# Patient Record
Sex: MALE | Race: WHITE | HISPANIC OR LATINO | Employment: OTHER | URBAN - METROPOLITAN AREA
[De-identification: names, ages, dates, MRNs, and addresses within clinical notes are randomized per-mention and may not be internally consistent; named-entity substitution may affect disease eponyms.]

---

## 2023-02-21 LAB
C REACTIVE PROTEIN (MG/L) IN SER/PLAS: 0.18 MG/DL
SEDIMENTATION RATE, ERYTHROCYTE: 23 MM/H (ref 0–20)

## 2023-02-23 LAB
GRAM STAIN: ABNORMAL
TISSUE/WOUND CULTURE/SMEAR: ABNORMAL

## 2023-03-07 LAB
C REACTIVE PROTEIN (MG/L) IN SER/PLAS: 0.61 MG/DL
SEDIMENTATION RATE, ERYTHROCYTE: 33 MM/H (ref 0–20)

## 2023-04-10 LAB
ALBUMIN (G/DL) IN SER/PLAS: 4.3 G/DL (ref 3.4–5)
ALBUMIN (MG/L) IN URINE: 123.9 MG/L
ALBUMIN/CREATININE (UG/MG) IN URINE: 86 UG/MG CRT (ref 0–30)
ANION GAP IN SER/PLAS: 10 MMOL/L (ref 10–20)
BASOPHILS (10*3/UL) IN BLOOD BY AUTOMATED COUNT: 0.05 X10E9/L (ref 0–0.1)
BASOPHILS/100 LEUKOCYTES IN BLOOD BY AUTOMATED COUNT: 0.5 % (ref 0–2)
CALCIUM (MG/DL) IN SER/PLAS: 9.5 MG/DL (ref 8.6–10.3)
CARBON DIOXIDE, TOTAL (MMOL/L) IN SER/PLAS: 31 MMOL/L (ref 21–32)
CHLORIDE (MMOL/L) IN SER/PLAS: 104 MMOL/L (ref 98–107)
CHOLESTEROL (MG/DL) IN SER/PLAS: 141 MG/DL (ref 0–199)
CHOLESTEROL IN HDL (MG/DL) IN SER/PLAS: 60.9 MG/DL
CHOLESTEROL/HDL RATIO: 2.3
CREATININE (MG/DL) IN SER/PLAS: 0.91 MG/DL (ref 0.5–1.3)
CREATININE (MG/DL) IN URINE: 144 MG/DL (ref 20–370)
EOSINOPHILS (10*3/UL) IN BLOOD BY AUTOMATED COUNT: 0.13 X10E9/L (ref 0–0.7)
EOSINOPHILS/100 LEUKOCYTES IN BLOOD BY AUTOMATED COUNT: 1.2 % (ref 0–6)
ERYTHROCYTE DISTRIBUTION WIDTH (RATIO) BY AUTOMATED COUNT: 14.5 % (ref 11.5–14.5)
ERYTHROCYTE MEAN CORPUSCULAR HEMOGLOBIN CONCENTRATION (G/DL) BY AUTOMATED: 31.5 G/DL (ref 32–36)
ERYTHROCYTE MEAN CORPUSCULAR VOLUME (FL) BY AUTOMATED COUNT: 93 FL (ref 80–100)
ERYTHROCYTES (10*6/UL) IN BLOOD BY AUTOMATED COUNT: 5.19 X10E12/L (ref 4.5–5.9)
GFR MALE: >90 ML/MIN/1.73M2
GLUCOSE (MG/DL) IN SER/PLAS: 93 MG/DL (ref 74–99)
HEMATOCRIT (%) IN BLOOD BY AUTOMATED COUNT: 48.5 % (ref 41–52)
HEMOGLOBIN (G/DL) IN BLOOD: 15.3 G/DL (ref 13.5–17.5)
IMMATURE GRANULOCYTES/100 LEUKOCYTES IN BLOOD BY AUTOMATED COUNT: 0.5 % (ref 0–0.9)
INR IN PPP BY COAGULATION ASSAY: 1 (ref 0.9–1.1)
LDL: 58 MG/DL (ref 0–99)
LEUKOCYTES (10*3/UL) IN BLOOD BY AUTOMATED COUNT: 11 X10E9/L (ref 4.4–11.3)
LYMPHOCYTES (10*3/UL) IN BLOOD BY AUTOMATED COUNT: 1.59 X10E9/L (ref 1.2–4.8)
LYMPHOCYTES/100 LEUKOCYTES IN BLOOD BY AUTOMATED COUNT: 14.5 % (ref 13–44)
MONOCYTES (10*3/UL) IN BLOOD BY AUTOMATED COUNT: 0.66 X10E9/L (ref 0.1–1)
MONOCYTES/100 LEUKOCYTES IN BLOOD BY AUTOMATED COUNT: 6 % (ref 2–10)
NEUTROPHILS (10*3/UL) IN BLOOD BY AUTOMATED COUNT: 8.51 X10E9/L (ref 1.2–7.7)
NEUTROPHILS/100 LEUKOCYTES IN BLOOD BY AUTOMATED COUNT: 77.3 % (ref 40–80)
NRBC (PER 100 WBCS) BY AUTOMATED COUNT: 0 /100 WBC (ref 0–0)
PHOSPHATE (MG/DL) IN SER/PLAS: 2.5 MG/DL (ref 2.5–4.9)
PLATELETS (10*3/UL) IN BLOOD AUTOMATED COUNT: 300 X10E9/L (ref 150–450)
POTASSIUM (MMOL/L) IN SER/PLAS: 4.4 MMOL/L (ref 3.5–5.3)
PROSTATE SPECIFIC AG (NG/ML) IN SER/PLAS: 1.88 NG/ML (ref 0–4)
PROTHROMBIN TIME (PT) IN PPP BY COAGULATION ASSAY: 11.1 SEC (ref 9.8–13.4)
SODIUM (MMOL/L) IN SER/PLAS: 141 MMOL/L (ref 136–145)
TRIGLYCERIDE (MG/DL) IN SER/PLAS: 110 MG/DL (ref 0–149)
UREA NITROGEN (MG/DL) IN SER/PLAS: 15 MG/DL (ref 6–23)
VLDL: 22 MG/DL (ref 0–40)

## 2023-04-24 ENCOUNTER — HOSPITAL ENCOUNTER (OUTPATIENT)
Dept: DATA CONVERSION | Facility: HOSPITAL | Age: 68
End: 2023-04-24
Attending: INTERNAL MEDICINE | Admitting: INTERNAL MEDICINE
Payer: MEDICARE

## 2023-04-24 DIAGNOSIS — I73.89 OTHER SPECIFIED PERIPHERAL VASCULAR DISEASES (CMS-HCC): ICD-10-CM

## 2023-04-24 DIAGNOSIS — Z98.1 ARTHRODESIS STATUS: ICD-10-CM

## 2023-04-24 DIAGNOSIS — Z96.652 PRESENCE OF LEFT ARTIFICIAL KNEE JOINT: ICD-10-CM

## 2023-04-24 DIAGNOSIS — I10 ESSENTIAL (PRIMARY) HYPERTENSION: ICD-10-CM

## 2023-04-24 DIAGNOSIS — E78.5 HYPERLIPIDEMIA, UNSPECIFIED: ICD-10-CM

## 2023-04-24 DIAGNOSIS — I99.8 OTHER DISORDER OF CIRCULATORY SYSTEM: ICD-10-CM

## 2023-04-24 DIAGNOSIS — Z53.9 PROCEDURE AND TREATMENT NOT CARRIED OUT, UNSPECIFIED REASON: ICD-10-CM

## 2023-04-24 LAB
INR IN PPP BY COAGULATION ASSAY: 1 (ref 0.9–1.1)
PROTHROMBIN TIME (PT) IN PPP BY COAGULATION ASSAY: 11.3 SEC (ref 9.8–13.4)

## 2023-05-09 LAB
C REACTIVE PROTEIN (MG/L) IN SER/PLAS: 8.74 MG/DL
SEDIMENTATION RATE, ERYTHROCYTE: 47 MM/H (ref 0–20)

## 2023-05-12 LAB
GRAM STAIN: ABNORMAL
TISSUE/WOUND CULTURE/SMEAR: ABNORMAL

## 2023-05-19 LAB
GRAM STAIN: ABNORMAL
TISSUE/WOUND CULTURE/SMEAR: ABNORMAL

## 2023-05-31 LAB
ANION GAP IN SER/PLAS: 11 MMOL/L (ref 10–20)
C REACTIVE PROTEIN (MG/L) IN SER/PLAS: 1.76 MG/DL
CALCIUM (MG/DL) IN SER/PLAS: 8.7 MG/DL (ref 8.6–10.3)
CARBON DIOXIDE, TOTAL (MMOL/L) IN SER/PLAS: 29 MMOL/L (ref 21–32)
CHLORIDE (MMOL/L) IN SER/PLAS: 104 MMOL/L (ref 98–107)
CREATININE (MG/DL) IN SER/PLAS: 0.8 MG/DL (ref 0.5–1.3)
ERYTHROCYTE DISTRIBUTION WIDTH (RATIO) BY AUTOMATED COUNT: 13.8 % (ref 11.5–14.5)
ERYTHROCYTE MEAN CORPUSCULAR HEMOGLOBIN CONCENTRATION (G/DL) BY AUTOMATED: 31.6 G/DL (ref 32–36)
ERYTHROCYTE MEAN CORPUSCULAR VOLUME (FL) BY AUTOMATED COUNT: 92 FL (ref 80–100)
ERYTHROCYTES (10*6/UL) IN BLOOD BY AUTOMATED COUNT: 4.26 X10E12/L (ref 4.5–5.9)
GFR MALE: >90 ML/MIN/1.73M2
GLUCOSE (MG/DL) IN SER/PLAS: 136 MG/DL (ref 74–99)
HEMATOCRIT (%) IN BLOOD BY AUTOMATED COUNT: 39.3 % (ref 41–52)
HEMOGLOBIN (G/DL) IN BLOOD: 12.4 G/DL (ref 13.5–17.5)
LEUKOCYTES (10*3/UL) IN BLOOD BY AUTOMATED COUNT: 10.1 X10E9/L (ref 4.4–11.3)
NRBC (PER 100 WBCS) BY AUTOMATED COUNT: 0 /100 WBC (ref 0–0)
PLATELETS (10*3/UL) IN BLOOD AUTOMATED COUNT: 313 X10E9/L (ref 150–450)
POTASSIUM (MMOL/L) IN SER/PLAS: 4.1 MMOL/L (ref 3.5–5.3)
SEDIMENTATION RATE, ERYTHROCYTE: 48 MM/H (ref 0–20)
SODIUM (MMOL/L) IN SER/PLAS: 140 MMOL/L (ref 136–145)
UREA NITROGEN (MG/DL) IN SER/PLAS: 13 MG/DL (ref 6–23)
VANCOMYCIN (UG/ML) IN SER/PLAS - TROUGH: 15 UG/ML (ref 5–20)

## 2023-06-06 LAB
ANION GAP IN SER/PLAS: 11 MMOL/L (ref 10–20)
C REACTIVE PROTEIN (MG/L) IN SER/PLAS: 0.43 MG/DL
CALCIUM (MG/DL) IN SER/PLAS: 8.5 MG/DL (ref 8.6–10.3)
CARBON DIOXIDE, TOTAL (MMOL/L) IN SER/PLAS: 28 MMOL/L (ref 21–32)
CHLORIDE (MMOL/L) IN SER/PLAS: 105 MMOL/L (ref 98–107)
CREATININE (MG/DL) IN SER/PLAS: 0.75 MG/DL (ref 0.5–1.3)
ERYTHROCYTE DISTRIBUTION WIDTH (RATIO) BY AUTOMATED COUNT: 13.5 % (ref 11.5–14.5)
ERYTHROCYTE MEAN CORPUSCULAR HEMOGLOBIN CONCENTRATION (G/DL) BY AUTOMATED: 30.4 G/DL (ref 32–36)
ERYTHROCYTE MEAN CORPUSCULAR VOLUME (FL) BY AUTOMATED COUNT: 93 FL (ref 80–100)
ERYTHROCYTES (10*6/UL) IN BLOOD BY AUTOMATED COUNT: 4.5 X10E12/L (ref 4.5–5.9)
GFR MALE: >90 ML/MIN/1.73M2
GLUCOSE (MG/DL) IN SER/PLAS: 145 MG/DL (ref 74–99)
HEMATOCRIT (%) IN BLOOD BY AUTOMATED COUNT: 41.8 % (ref 41–52)
HEMOGLOBIN (G/DL) IN BLOOD: 12.7 G/DL (ref 13.5–17.5)
LEUKOCYTES (10*3/UL) IN BLOOD BY AUTOMATED COUNT: 10.4 X10E9/L (ref 4.4–11.3)
NRBC (PER 100 WBCS) BY AUTOMATED COUNT: 0 /100 WBC (ref 0–0)
PLATELETS (10*3/UL) IN BLOOD AUTOMATED COUNT: 270 X10E9/L (ref 150–450)
POTASSIUM (MMOL/L) IN SER/PLAS: 3.9 MMOL/L (ref 3.5–5.3)
SEDIMENTATION RATE, ERYTHROCYTE: 29 MM/H (ref 0–20)
SODIUM (MMOL/L) IN SER/PLAS: 140 MMOL/L (ref 136–145)
UREA NITROGEN (MG/DL) IN SER/PLAS: 15 MG/DL (ref 6–23)
VANCOMYCIN (UG/ML) IN SER/PLAS - TROUGH: 14 UG/ML (ref 5–20)

## 2023-06-14 LAB
ANION GAP IN SER/PLAS: 13 MMOL/L (ref 10–20)
C REACTIVE PROTEIN (MG/L) IN SER/PLAS: 1.13 MG/DL
CALCIUM (MG/DL) IN SER/PLAS: 9.4 MG/DL (ref 8.6–10.3)
CARBON DIOXIDE, TOTAL (MMOL/L) IN SER/PLAS: 29 MMOL/L (ref 21–32)
CHLORIDE (MMOL/L) IN SER/PLAS: 102 MMOL/L (ref 98–107)
CREATININE (MG/DL) IN SER/PLAS: 0.86 MG/DL (ref 0.5–1.3)
ERYTHROCYTE DISTRIBUTION WIDTH (RATIO) BY AUTOMATED COUNT: 13.6 % (ref 11.5–14.5)
ERYTHROCYTE MEAN CORPUSCULAR HEMOGLOBIN CONCENTRATION (G/DL) BY AUTOMATED: 32 G/DL (ref 32–36)
ERYTHROCYTE MEAN CORPUSCULAR VOLUME (FL) BY AUTOMATED COUNT: 91 FL (ref 80–100)
ERYTHROCYTES (10*6/UL) IN BLOOD BY AUTOMATED COUNT: 4.82 X10E12/L (ref 4.5–5.9)
GFR MALE: >90 ML/MIN/1.73M2
GLUCOSE (MG/DL) IN SER/PLAS: 130 MG/DL (ref 74–99)
HEMATOCRIT (%) IN BLOOD BY AUTOMATED COUNT: 43.8 % (ref 41–52)
HEMOGLOBIN (G/DL) IN BLOOD: 14 G/DL (ref 13.5–17.5)
LEUKOCYTES (10*3/UL) IN BLOOD BY AUTOMATED COUNT: 12.6 X10E9/L (ref 4.4–11.3)
NRBC (PER 100 WBCS) BY AUTOMATED COUNT: 0 /100 WBC (ref 0–0)
PLATELETS (10*3/UL) IN BLOOD AUTOMATED COUNT: 290 X10E9/L (ref 150–450)
POTASSIUM (MMOL/L) IN SER/PLAS: 3.5 MMOL/L (ref 3.5–5.3)
SEDIMENTATION RATE, ERYTHROCYTE: 36 MM/H (ref 0–20)
SODIUM (MMOL/L) IN SER/PLAS: 140 MMOL/L (ref 136–145)
UREA NITROGEN (MG/DL) IN SER/PLAS: 13 MG/DL (ref 6–23)
VANCOMYCIN (UG/ML) IN SER/PLAS - TROUGH: 14.9 UG/ML (ref 5–20)

## 2023-06-20 LAB
ANION GAP IN SER/PLAS: 12 MMOL/L (ref 10–20)
C REACTIVE PROTEIN (MG/L) IN SER/PLAS: 8.38 MG/DL
CALCIUM (MG/DL) IN SER/PLAS: 8.9 MG/DL (ref 8.6–10.3)
CARBON DIOXIDE, TOTAL (MMOL/L) IN SER/PLAS: 29 MMOL/L (ref 21–32)
CHLORIDE (MMOL/L) IN SER/PLAS: 104 MMOL/L (ref 98–107)
CREATININE (MG/DL) IN SER/PLAS: 0.75 MG/DL (ref 0.5–1.3)
ERYTHROCYTE DISTRIBUTION WIDTH (RATIO) BY AUTOMATED COUNT: 13.9 % (ref 11.5–14.5)
ERYTHROCYTE MEAN CORPUSCULAR HEMOGLOBIN CONCENTRATION (G/DL) BY AUTOMATED: 31.9 G/DL (ref 32–36)
ERYTHROCYTE MEAN CORPUSCULAR VOLUME (FL) BY AUTOMATED COUNT: 93 FL (ref 80–100)
ERYTHROCYTES (10*6/UL) IN BLOOD BY AUTOMATED COUNT: 4.13 X10E12/L (ref 4.5–5.9)
GFR MALE: >90 ML/MIN/1.73M2
GLUCOSE (MG/DL) IN SER/PLAS: 109 MG/DL (ref 74–99)
HEMATOCRIT (%) IN BLOOD BY AUTOMATED COUNT: 38.5 % (ref 41–52)
HEMOGLOBIN (G/DL) IN BLOOD: 12.3 G/DL (ref 13.5–17.5)
LEUKOCYTES (10*3/UL) IN BLOOD BY AUTOMATED COUNT: 9.4 X10E9/L (ref 4.4–11.3)
NRBC (PER 100 WBCS) BY AUTOMATED COUNT: 0 /100 WBC (ref 0–0)
PLATELETS (10*3/UL) IN BLOOD AUTOMATED COUNT: 253 X10E9/L (ref 150–450)
POTASSIUM (MMOL/L) IN SER/PLAS: 3.6 MMOL/L (ref 3.5–5.3)
SEDIMENTATION RATE, ERYTHROCYTE: 44 MM/H (ref 0–20)
SODIUM (MMOL/L) IN SER/PLAS: 141 MMOL/L (ref 136–145)
UREA NITROGEN (MG/DL) IN SER/PLAS: 13 MG/DL (ref 6–23)
VANCOMYCIN (UG/ML) IN SER/PLAS - TROUGH: 15.2 UG/ML (ref 5–20)

## 2023-06-27 LAB
ANION GAP IN SER/PLAS: 12 MMOL/L (ref 10–20)
C REACTIVE PROTEIN (MG/L) IN SER/PLAS: 3.37 MG/DL
CALCIUM (MG/DL) IN SER/PLAS: 8.9 MG/DL (ref 8.6–10.3)
CARBON DIOXIDE, TOTAL (MMOL/L) IN SER/PLAS: 28 MMOL/L (ref 21–32)
CHLORIDE (MMOL/L) IN SER/PLAS: 104 MMOL/L (ref 98–107)
CREATININE (MG/DL) IN SER/PLAS: 0.67 MG/DL (ref 0.5–1.3)
ERYTHROCYTE DISTRIBUTION WIDTH (RATIO) BY AUTOMATED COUNT: 13.4 % (ref 11.5–14.5)
ERYTHROCYTE MEAN CORPUSCULAR HEMOGLOBIN CONCENTRATION (G/DL) BY AUTOMATED: 31.1 G/DL (ref 32–36)
ERYTHROCYTE MEAN CORPUSCULAR VOLUME (FL) BY AUTOMATED COUNT: 91 FL (ref 80–100)
ERYTHROCYTES (10*6/UL) IN BLOOD BY AUTOMATED COUNT: 4.38 X10E12/L (ref 4.5–5.9)
GFR MALE: >90 ML/MIN/1.73M2
GLUCOSE (MG/DL) IN SER/PLAS: 129 MG/DL (ref 74–99)
HEMATOCRIT (%) IN BLOOD BY AUTOMATED COUNT: 39.9 % (ref 41–52)
HEMOGLOBIN (G/DL) IN BLOOD: 12.4 G/DL (ref 13.5–17.5)
LEUKOCYTES (10*3/UL) IN BLOOD BY AUTOMATED COUNT: 9.4 X10E9/L (ref 4.4–11.3)
NRBC (PER 100 WBCS) BY AUTOMATED COUNT: 0 /100 WBC (ref 0–0)
PLATELETS (10*3/UL) IN BLOOD AUTOMATED COUNT: 301 X10E9/L (ref 150–450)
POTASSIUM (MMOL/L) IN SER/PLAS: 3.7 MMOL/L (ref 3.5–5.3)
SEDIMENTATION RATE, ERYTHROCYTE: 51 MM/H (ref 0–20)
SODIUM (MMOL/L) IN SER/PLAS: 140 MMOL/L (ref 136–145)
UREA NITROGEN (MG/DL) IN SER/PLAS: 12 MG/DL (ref 6–23)
VANCOMYCIN (UG/ML) IN SER/PLAS - TROUGH: 16.4 UG/ML (ref 5–20)

## 2023-07-04 LAB
ANION GAP IN SER/PLAS: 10 MMOL/L (ref 10–20)
C REACTIVE PROTEIN (MG/L) IN SER/PLAS: 1.18 MG/DL
CALCIUM (MG/DL) IN SER/PLAS: 8.8 MG/DL (ref 8.6–10.3)
CARBON DIOXIDE, TOTAL (MMOL/L) IN SER/PLAS: 30 MMOL/L (ref 21–32)
CHLORIDE (MMOL/L) IN SER/PLAS: 102 MMOL/L (ref 98–107)
CREATININE (MG/DL) IN SER/PLAS: 0.72 MG/DL (ref 0.5–1.3)
ERYTHROCYTE DISTRIBUTION WIDTH (RATIO) BY AUTOMATED COUNT: 13.1 % (ref 11.5–14.5)
ERYTHROCYTE MEAN CORPUSCULAR HEMOGLOBIN CONCENTRATION (G/DL) BY AUTOMATED: 31.6 G/DL (ref 32–36)
ERYTHROCYTE MEAN CORPUSCULAR VOLUME (FL) BY AUTOMATED COUNT: 89 FL (ref 80–100)
ERYTHROCYTES (10*6/UL) IN BLOOD BY AUTOMATED COUNT: 4.43 X10E12/L (ref 4.5–5.9)
GFR MALE: >90 ML/MIN/1.73M2
GLUCOSE (MG/DL) IN SER/PLAS: 100 MG/DL (ref 74–99)
HEMATOCRIT (%) IN BLOOD BY AUTOMATED COUNT: 39.6 % (ref 41–52)
HEMOGLOBIN (G/DL) IN BLOOD: 12.5 G/DL (ref 13.5–17.5)
LEUKOCYTES (10*3/UL) IN BLOOD BY AUTOMATED COUNT: 10.5 X10E9/L (ref 4.4–11.3)
NRBC (PER 100 WBCS) BY AUTOMATED COUNT: 0 /100 WBC (ref 0–0)
PLATELETS (10*3/UL) IN BLOOD AUTOMATED COUNT: 308 X10E9/L (ref 150–450)
POTASSIUM (MMOL/L) IN SER/PLAS: 3.9 MMOL/L (ref 3.5–5.3)
SEDIMENTATION RATE, ERYTHROCYTE: 50 MM/H (ref 0–20)
SODIUM (MMOL/L) IN SER/PLAS: 138 MMOL/L (ref 136–145)
UREA NITROGEN (MG/DL) IN SER/PLAS: 17 MG/DL (ref 6–23)
VANCOMYCIN (UG/ML) IN SER/PLAS - TROUGH: 15.5 UG/ML (ref 5–20)

## 2023-07-18 ENCOUNTER — NURSING HOME VISIT (OUTPATIENT)
Dept: POST ACUTE CARE | Facility: EXTERNAL LOCATION | Age: 68
End: 2023-07-18
Payer: MEDICARE

## 2023-07-18 DIAGNOSIS — M51.37 DISC DEGENERATION, LUMBOSACRAL: Primary | ICD-10-CM

## 2023-07-18 DIAGNOSIS — M48.062 SPINAL STENOSIS OF LUMBAR REGION WITH NEUROGENIC CLAUDICATION: ICD-10-CM

## 2023-07-18 DIAGNOSIS — G62.9 NEUROPATHY: ICD-10-CM

## 2023-07-18 DIAGNOSIS — M96.1 POSTLAMINECTOMY SYNDROME, LUMBAR: ICD-10-CM

## 2023-07-18 DIAGNOSIS — M54.50 CHRONIC BILATERAL LOW BACK PAIN, UNSPECIFIED WHETHER SCIATICA PRESENT: ICD-10-CM

## 2023-07-18 DIAGNOSIS — G89.29 CHRONIC BILATERAL LOW BACK PAIN, UNSPECIFIED WHETHER SCIATICA PRESENT: ICD-10-CM

## 2023-07-18 PROCEDURE — 99305 1ST NF CARE MODERATE MDM 35: CPT | Performed by: INTERNAL MEDICINE

## 2023-07-18 NOTE — LETTER
Patient: Adrián Meza  : 1955    Encounter Date: 2023    HISTORY & PHYSICAL    Subjective  Chief complaint: Adrián Meza is a 68 y.o. male who is a acute skilled care patient being seen and evaluated for multiple medical problems.  Patient presents for weakness.    HPI:  Patient was admitted to ED for s/p left great toe amputation. Patient has been receiving IV cefepime at home and has been following with wound clinic. Patient was started on IV antibiotics. Pt is non weight bearing on left foot. Pt was evaluated by PT and OT and discharged to SNF.        Past Medical History:   Diagnosis Date   • Cellulitis of left lower limb     Cellulitis of left leg   • Unilateral inguinal hernia, without obstruction or gangrene, not specified as recurrent     Inguinal hernia       Past Surgical History:   Procedure Laterality Date   • CERVICAL FUSION  2013    Cervical Vertebral Fusion   • KNEE ARTHROSCOPY W/ DEBRIDEMENT  2013    Arthroscopy Knee Left   • KNEE ARTHROSCOPY W/ DEBRIDEMENT  2013    Arthroscopy Knee Left   • LUMBAR FUSION  2013    Lumbar Vertebral Fusion   • TOTAL KNEE ARTHROPLASTY  2013    Knee Replacement       No family history on file.    Social History     Socioeconomic History   • Marital status:      Spouse name: Not on file   • Number of children: Not on file   • Years of education: Not on file   • Highest education level: Not on file   Occupational History   • Not on file   Tobacco Use   • Smoking status: Not on file   • Smokeless tobacco: Not on file   Substance and Sexual Activity   • Alcohol use: Not on file   • Drug use: Not on file   • Sexual activity: Not on file   Other Topics Concern   • Not on file   Social History Narrative   • Not on file     Social Determinants of Health     Financial Resource Strain: Not on file   Food Insecurity: Not on file   Transportation Needs: Not on file   Physical Activity: Not on file   Stress: Not on file   Social  Connections: Not on file   Intimate Partner Violence: Not on file   Housing Stability: Not on file       Vital signs: 144/83, 97.3, 82, 18, 97%    Objective  Physical Exam  HENT:      Head: Normocephalic and atraumatic.      Nose: Nose normal.      Mouth/Throat:      Mouth: Mucous membranes are moist.      Pharynx: Oropharynx is clear.   Eyes:      Extraocular Movements: Extraocular movements intact.      Pupils: Pupils are equal, round, and reactive to light.   Cardiovascular:      Rate and Rhythm: Normal rate and regular rhythm.   Pulmonary:      Effort: No respiratory distress.      Breath sounds: Normal breath sounds. No wheezing, rhonchi or rales.   Abdominal:      General: Bowel sounds are normal. There is no distension.      Palpations: Abdomen is soft.      Tenderness: There is no abdominal tenderness. There is no guarding.   Musculoskeletal:      Right lower leg: No edema.      Left lower leg: No edema.   Skin:     General: Skin is warm and dry.   Neurological:      Mental Status: He is alert. Mental status is at baseline.         Assessment/Plan  Problem List Items Addressed This Visit       Disc degeneration, lumbosacral - Primary    Lumbago    Lumbar canal stenosis    Neuropathy    Postlaminectomy syndrome, lumbar     Medications, treatments, and labs reviewed  Continue medications and treatments as listed in [facility]    Scribe Attestation  I, Gentry Pedersen   attest that this documentation has been prepared under the direction and in the presence of Jose Sierra DO.    An interactive audio and/or video telecommunication system which permits real time communications between the patient (at the originating site) and provider (at a distant site) was utilized to provide this telehealth service after obtaining verbal consent.    Provider Attestation - Scribe documentation  All medical record entries made by the Scribe were at my direction and personally dictated by me. I have reviewed the chart  and agree that the record accurately reflects my personal performance of the history, physical exam, discussion and plan.    Jose Sierra DO          Electronically Signed By: Jose Sierra DO   8/8/23  9:19 PM

## 2023-07-25 LAB
ANION GAP IN SER/PLAS: 15 MMOL/L (ref 10–20)
C REACTIVE PROTEIN (MG/L) IN SER/PLAS: 0.21 MG/DL
CALCIUM (MG/DL) IN SER/PLAS: 9.1 MG/DL (ref 8.6–10.6)
CARBON DIOXIDE, TOTAL (MMOL/L) IN SER/PLAS: 28 MMOL/L (ref 21–32)
CHLORIDE (MMOL/L) IN SER/PLAS: 105 MMOL/L (ref 98–107)
CREATININE (MG/DL) IN SER/PLAS: 0.77 MG/DL (ref 0.5–1.3)
ERYTHROCYTE DISTRIBUTION WIDTH (RATIO) BY AUTOMATED COUNT: 14.2 % (ref 11.5–14.5)
ERYTHROCYTE MEAN CORPUSCULAR HEMOGLOBIN CONCENTRATION (G/DL) BY AUTOMATED: 31.5 G/DL (ref 32–36)
ERYTHROCYTE MEAN CORPUSCULAR VOLUME (FL) BY AUTOMATED COUNT: 91 FL (ref 80–100)
ERYTHROCYTES (10*6/UL) IN BLOOD BY AUTOMATED COUNT: 4.29 X10E12/L (ref 4.5–5.9)
GFR MALE: >90 ML/MIN/1.73M2
GLUCOSE (MG/DL) IN SER/PLAS: 52 MG/DL (ref 74–99)
HEMATOCRIT (%) IN BLOOD BY AUTOMATED COUNT: 39.1 % (ref 41–52)
HEMOGLOBIN (G/DL) IN BLOOD: 12.3 G/DL (ref 13.5–17.5)
LEUKOCYTES (10*3/UL) IN BLOOD BY AUTOMATED COUNT: 9.2 X10E9/L (ref 4.4–11.3)
NRBC (PER 100 WBCS) BY AUTOMATED COUNT: 0 /100 WBC (ref 0–0)
PLATELETS (10*3/UL) IN BLOOD AUTOMATED COUNT: 233 X10E9/L (ref 150–450)
POTASSIUM (MMOL/L) IN SER/PLAS: 4 MMOL/L (ref 3.5–5.3)
SEDIMENTATION RATE, ERYTHROCYTE: 24 MM/H (ref 0–20)
SODIUM (MMOL/L) IN SER/PLAS: 144 MMOL/L (ref 136–145)
UREA NITROGEN (MG/DL) IN SER/PLAS: 14 MG/DL (ref 6–23)
VANCOMYCIN (UG/ML) IN SER/PLAS - TROUGH: 13.7 UG/ML (ref 5–20)

## 2023-08-01 LAB
ANION GAP IN SER/PLAS: 10 MMOL/L (ref 10–20)
C REACTIVE PROTEIN (MG/L) IN SER/PLAS: 0.21 MG/DL
CALCIUM (MG/DL) IN SER/PLAS: 9.2 MG/DL (ref 8.6–10.6)
CARBON DIOXIDE, TOTAL (MMOL/L) IN SER/PLAS: 31 MMOL/L (ref 21–32)
CHLORIDE (MMOL/L) IN SER/PLAS: 104 MMOL/L (ref 98–107)
CREATININE (MG/DL) IN SER/PLAS: 0.75 MG/DL (ref 0.5–1.3)
ERYTHROCYTE DISTRIBUTION WIDTH (RATIO) BY AUTOMATED COUNT: 14.3 % (ref 11.5–14.5)
ERYTHROCYTE MEAN CORPUSCULAR HEMOGLOBIN CONCENTRATION (G/DL) BY AUTOMATED: 30.9 G/DL (ref 32–36)
ERYTHROCYTE MEAN CORPUSCULAR VOLUME (FL) BY AUTOMATED COUNT: 90 FL (ref 80–100)
ERYTHROCYTES (10*6/UL) IN BLOOD BY AUTOMATED COUNT: 4.34 X10E12/L (ref 4.5–5.9)
GFR MALE: >90 ML/MIN/1.73M2
GLUCOSE (MG/DL) IN SER/PLAS: 103 MG/DL (ref 74–99)
HEMATOCRIT (%) IN BLOOD BY AUTOMATED COUNT: 39.2 % (ref 41–52)
HEMOGLOBIN (G/DL) IN BLOOD: 12.1 G/DL (ref 13.5–17.5)
LEUKOCYTES (10*3/UL) IN BLOOD BY AUTOMATED COUNT: 9.8 X10E9/L (ref 4.4–11.3)
NRBC (PER 100 WBCS) BY AUTOMATED COUNT: 0 /100 WBC (ref 0–0)
PLATELETS (10*3/UL) IN BLOOD AUTOMATED COUNT: 218 X10E9/L (ref 150–450)
POTASSIUM (MMOL/L) IN SER/PLAS: 4.1 MMOL/L (ref 3.5–5.3)
SEDIMENTATION RATE, ERYTHROCYTE: 16 MM/H (ref 0–20)
SODIUM (MMOL/L) IN SER/PLAS: 141 MMOL/L (ref 136–145)
UREA NITROGEN (MG/DL) IN SER/PLAS: 16 MG/DL (ref 6–23)
VANCOMYCIN (UG/ML) IN SER/PLAS - TROUGH: 14.3 UG/ML (ref 5–20)

## 2023-08-04 NOTE — PROGRESS NOTES
HISTORY & PHYSICAL    Subjective   Chief complaint: Adrián Meza is a 68 y.o. male who is a acute skilled care patient being seen and evaluated for multiple medical problems.  Patient presents for weakness.    HPI:  Patient was admitted to ED for s/p left great toe amputation. Patient has been receiving IV cefepime at home and has been following with wound clinic. Patient was started on IV antibiotics. Pt is non weight bearing on left foot. Pt was evaluated by PT and OT and discharged to SNF.        Past Medical History:   Diagnosis Date    Cellulitis of left lower limb     Cellulitis of left leg    Unilateral inguinal hernia, without obstruction or gangrene, not specified as recurrent     Inguinal hernia       Past Surgical History:   Procedure Laterality Date    CERVICAL FUSION  06/26/2013    Cervical Vertebral Fusion    KNEE ARTHROSCOPY W/ DEBRIDEMENT  06/26/2013    Arthroscopy Knee Left    KNEE ARTHROSCOPY W/ DEBRIDEMENT  06/26/2013    Arthroscopy Knee Left    LUMBAR FUSION  06/26/2013    Lumbar Vertebral Fusion    TOTAL KNEE ARTHROPLASTY  06/26/2013    Knee Replacement       No family history on file.    Social History     Socioeconomic History    Marital status:      Spouse name: Not on file    Number of children: Not on file    Years of education: Not on file    Highest education level: Not on file   Occupational History    Not on file   Tobacco Use    Smoking status: Not on file    Smokeless tobacco: Not on file   Substance and Sexual Activity    Alcohol use: Not on file    Drug use: Not on file    Sexual activity: Not on file   Other Topics Concern    Not on file   Social History Narrative    Not on file     Social Determinants of Health     Financial Resource Strain: Not on file   Food Insecurity: Not on file   Transportation Needs: Not on file   Physical Activity: Not on file   Stress: Not on file   Social Connections: Not on file   Intimate Partner Violence: Not on file   Housing Stability: Not  on file       Vital signs: 144/83, 97.3, 82, 18, 97%    Objective   Physical Exam  HENT:      Head: Normocephalic and atraumatic.      Nose: Nose normal.      Mouth/Throat:      Mouth: Mucous membranes are moist.      Pharynx: Oropharynx is clear.   Eyes:      Extraocular Movements: Extraocular movements intact.      Pupils: Pupils are equal, round, and reactive to light.   Cardiovascular:      Rate and Rhythm: Normal rate and regular rhythm.   Pulmonary:      Effort: No respiratory distress.      Breath sounds: Normal breath sounds. No wheezing, rhonchi or rales.   Abdominal:      General: Bowel sounds are normal. There is no distension.      Palpations: Abdomen is soft.      Tenderness: There is no abdominal tenderness. There is no guarding.   Musculoskeletal:      Right lower leg: No edema.      Left lower leg: No edema.   Skin:     General: Skin is warm and dry.   Neurological:      Mental Status: He is alert. Mental status is at baseline.         Assessment/Plan   Problem List Items Addressed This Visit       Disc degeneration, lumbosacral - Primary    Lumbago    Lumbar canal stenosis    Neuropathy    Postlaminectomy syndrome, lumbar     Medications, treatments, and labs reviewed  Continue medications and treatments as listed in [facility]    Scribe Attestation  I, Gentry Pedersen   attest that this documentation has been prepared under the direction and in the presence of Jose Sierra DO.    An interactive audio and/or video telecommunication system which permits real time communications between the patient (at the originating site) and provider (at a distant site) was utilized to provide this telehealth service after obtaining verbal consent.    Provider Attestation - Scribe documentation  All medical record entries made by the Scribe were at my direction and personally dictated by me. I have reviewed the chart and agree that the record accurately reflects my personal performance of the history,  physical exam, discussion and plan.    Jose Sierra, DO

## 2023-08-08 PROBLEM — M96.1 POSTLAMINECTOMY SYNDROME, LUMBAR: Status: ACTIVE | Noted: 2023-08-08

## 2023-08-08 PROBLEM — G62.9 NEUROPATHY: Status: ACTIVE | Noted: 2023-08-08

## 2023-08-08 PROBLEM — M54.50 LUMBAGO: Status: ACTIVE | Noted: 2023-08-08

## 2023-08-08 PROBLEM — M51.379 DISC DEGENERATION, LUMBOSACRAL: Status: ACTIVE | Noted: 2023-08-08

## 2023-08-08 PROBLEM — M48.061 LUMBAR CANAL STENOSIS: Status: ACTIVE | Noted: 2023-08-08

## 2023-08-08 PROBLEM — M51.37 DISC DEGENERATION, LUMBOSACRAL: Status: ACTIVE | Noted: 2023-08-08

## 2023-09-14 NOTE — H&P
History of Present Illness:   History Present Illness:  Reason for surgery: angio peripheral Left leg   HPI:    This is a 67-year-old male with a history of dyslipidemia, hypertension who presented with the CVLTi evaluation for the left lower extremity consistent with Diana  classification 5.  ELISSA/TBI/PVR from March showed at least mild disease to the left ankle, dampened waveforms of the digit.  Today the patient presents for a left lower extremity peripheral angio with Dr. Aristides Anthony.    Past medical history: As above  Past surgical history: Arthroscopy of left knee, cervical vertebral fusion, left knee replacement  Family history: Unrelated to presenting problem  Social history: Denies drug daily EtOH or illicit drug use.    Past Medical History:        Medical History:   Ulcer of lower extremity: Onset Date: 03-Nov-2022    Allergies:        Allergies:  ·  penicillin : Swelling/Edema  ·  amoxicillin : Other  ·  Cymbalta : Other    Home Medication Review:   Home Medications Reviewed: no     Impression/Procedure:   ·  Impression and Planned Procedure: Today the patient presents for a left lower extremity peripheral angio with Dr. Gabrielle BRADSHAW (Enhanced Recovery After Surgery):  ·  AUGUSTINE Patient: no       Physical Exam by System:    Respiratory/Thorax: clear   Cardiovascular: regular     Airway/Sedation Assessment:  ·  Emotional Status calm   ·  Neurologic alert & oriented x 3   ·  Respiratory clear to auscultation   ·  Cardiovascular rhythm & rate regular   ·  GI/ soft, nontender     · Pulses AS [facility] phys assess pulse FT doppled     ·  Mouth Opening OK yes   ·  Neck Flexibility OK yes   ·  Loose Teeth no   ·  Oropharyngeal Classification Class III   ·  ASA PS Classification ASA III   ·  Sedation Plan light sedation     Consent:   COVID-19 Consent:  ·  COVID-19 Risk Consent Surgeon has reviewed key risks related to the risk of aaron COVID-19 and if they contract COVID-19 what the risks are.        Electronic Signatures:  Aristides Anthony)  (Signed 28-Apr-2023 15:01)   Co-Signer: History of Present Illness, Past Medical History,  Allergies, Home Medication Review, Impression/Procedure, ERAS, Physical Exam, Consent, Note Completion  Jeanine Shirley (APRN-CNP)  (Signed 24-Apr-2023 09:34)   Authored: History of Present Illness, Past Medical History,  Allergies, Home Medication Review, Impression/Procedure, ERAS, Physical Exam, Consent, Note Completion      Last Updated: 28-Apr-2023 15:01 by Aristides Anthony)

## 2023-11-13 ENCOUNTER — OFFICE (OUTPATIENT)
Dept: URBAN - NONMETROPOLITAN AREA CLINIC 1 | Facility: CLINIC | Age: 68
End: 2023-11-13
Payer: COMMERCIAL

## 2023-11-13 VITALS
HEART RATE: 87 BPM | SYSTOLIC BLOOD PRESSURE: 180 MMHG | DIASTOLIC BLOOD PRESSURE: 115 MMHG | WEIGHT: 251 LBS | HEIGHT: 69 IN

## 2023-11-13 DIAGNOSIS — I10 ESSENTIAL (PRIMARY) HYPERTENSION: ICD-10-CM

## 2023-11-13 DIAGNOSIS — K21.9 GASTRO-ESOPHAGEAL REFLUX DISEASE WITHOUT ESOPHAGITIS: ICD-10-CM

## 2023-11-13 DIAGNOSIS — Z79.1 LONG TERM (CURRENT) USE OF NON-STEROIDAL ANTI-INFLAMMATORIES: ICD-10-CM

## 2023-11-13 DIAGNOSIS — K92.1 MELENA: ICD-10-CM

## 2023-11-13 PROCEDURE — 99204 OFFICE O/P NEW MOD 45 MIN: CPT | Performed by: NURSE PRACTITIONER

## 2023-11-13 NOTE — SERVICENOTES
Risks of procedure explained to patient and he wishes to proceed.
Bowel prep prescribed and instructions given to patient. 
Refuses the EGD at this time.
Stop Diclofenac.
F/u with PCP on HTN.

## 2023-11-13 NOTE — SERVICEHPINOTES
Patient with a history of COPD, HTN, GERD, HLD, Arthritis, presents to the clinic today for hematochezia.  He reports noticing bright red stools in September 2023 after he started a new medication gabapentin and amitriptyline -he has since stopped both of these medications and reports the bleeding has ceased.  Has 1-2 BM daily, reports constipation maybe once a week.   Denies melena, n/v/d, unintentional weight loss, or abdominal pain.  He has never had colonoscopy or EGD in the past.  Denies anticoagulation therapy for cardiac stents.  Admits to Diclofenac daily for arthritis.  He also take Omeprazole po daily for GERD, denies any upper gi complaints or hematemesis.  Denies a history of colon cancer.

## 2023-11-14 LAB
CBC, PLATELET, NO DIFFERENTIAL: HEMATOCRIT: 43.1 % (ref 37.5–51)
CBC, PLATELET, NO DIFFERENTIAL: HEMOGLOBIN: 13.7 G/DL (ref 13–17.7)
CBC, PLATELET, NO DIFFERENTIAL: MCH: 27.7 PG (ref 26.6–33)
CBC, PLATELET, NO DIFFERENTIAL: MCHC: 31.8 G/DL (ref 31.5–35.7)
CBC, PLATELET, NO DIFFERENTIAL: MCV: 87 FL (ref 79–97)
CBC, PLATELET, NO DIFFERENTIAL: NRBC: (no result)
CBC, PLATELET, NO DIFFERENTIAL: PLATELETS: 309 X10E3/UL (ref 150–450)
CBC, PLATELET, NO DIFFERENTIAL: RBC: 4.95 X10E6/UL (ref 4.14–5.8)
CBC, PLATELET, NO DIFFERENTIAL: RDW: 15.6 % — HIGH (ref 11.6–15.4)
CBC, PLATELET, NO DIFFERENTIAL: WBC: 11.4 X10E3/UL — HIGH (ref 3.4–10.8)

## 2023-12-28 ENCOUNTER — HOSPITAL ENCOUNTER (OUTPATIENT)
Dept: RADIOLOGY | Facility: CLINIC | Age: 68
Discharge: HOME | End: 2023-12-28
Payer: MEDICARE

## 2023-12-28 DIAGNOSIS — Z12.2 ENCOUNTER FOR SCREENING FOR MALIGNANT NEOPLASM OF RESPIRATORY ORGANS: ICD-10-CM

## 2023-12-28 DIAGNOSIS — F17.290 NICOTINE DEPENDENCE, OTHER TOBACCO PRODUCT, UNCOMPLICATED: ICD-10-CM

## 2023-12-28 PROCEDURE — 71271 CT THORAX LUNG CANCER SCR C-: CPT | Mod: LIO | Performed by: RADIOLOGY

## 2023-12-28 PROCEDURE — 71271 CT THORAX LUNG CANCER SCR C-: CPT | Mod: LIO,ONBASE

## 2024-02-27 ENCOUNTER — LAB (OUTPATIENT)
Dept: LAB | Facility: LAB | Age: 69
End: 2024-02-27
Payer: MEDICARE

## 2024-02-27 DIAGNOSIS — I10 ESSENTIAL (PRIMARY) HYPERTENSION: Primary | ICD-10-CM

## 2024-02-27 LAB
BUN SERPL-MCNC: 18 MG/DL (ref 6–23)
CREAT SERPL-MCNC: 0.85 MG/DL (ref 0.5–1.3)
EGFRCR SERPLBLD CKD-EPI 2021: >90 ML/MIN/1.73M*2

## 2024-02-27 PROCEDURE — 36415 COLL VENOUS BLD VENIPUNCTURE: CPT

## 2024-02-27 PROCEDURE — 84520 ASSAY OF UREA NITROGEN: CPT

## 2024-02-27 PROCEDURE — 82565 ASSAY OF CREATININE: CPT

## 2024-03-04 ENCOUNTER — HOSPITAL ENCOUNTER (OUTPATIENT)
Dept: RADIOLOGY | Facility: HOSPITAL | Age: 69
Discharge: HOME | End: 2024-03-04
Payer: MEDICARE

## 2024-03-04 DIAGNOSIS — J98.4 OTHER DISORDERS OF LUNG: ICD-10-CM

## 2024-03-04 PROCEDURE — 71260 CT THORAX DX C+: CPT

## 2024-03-04 PROCEDURE — 2550000001 HC RX 255 CONTRASTS: Performed by: INTERNAL MEDICINE

## 2024-03-04 RX ADMIN — IOHEXOL 75 ML: 350 INJECTION, SOLUTION INTRAVENOUS at 09:36

## 2024-04-15 ENCOUNTER — LAB (OUTPATIENT)
Dept: LAB | Facility: LAB | Age: 69
End: 2024-04-15
Payer: MEDICARE

## 2024-04-15 DIAGNOSIS — R63.4 ABNORMAL WEIGHT LOSS: Primary | ICD-10-CM

## 2024-04-15 LAB
ALBUMIN SERPL BCP-MCNC: 4.1 G/DL (ref 3.4–5)
ALP SERPL-CCNC: 86 U/L (ref 33–136)
ALT SERPL W P-5'-P-CCNC: 11 U/L (ref 10–52)
ANION GAP SERPL CALC-SCNC: 11 MMOL/L (ref 10–20)
AST SERPL W P-5'-P-CCNC: 15 U/L (ref 9–39)
BASOPHILS # BLD AUTO: 0.04 X10*3/UL (ref 0–0.1)
BASOPHILS NFR BLD AUTO: 0.4 %
BILIRUB SERPL-MCNC: 0.6 MG/DL (ref 0–1.2)
BUN SERPL-MCNC: 14 MG/DL (ref 6–23)
CALCIUM SERPL-MCNC: 8.8 MG/DL (ref 8.6–10.3)
CHLORIDE SERPL-SCNC: 107 MMOL/L (ref 98–107)
CO2 SERPL-SCNC: 27 MMOL/L (ref 21–32)
CREAT SERPL-MCNC: 0.8 MG/DL (ref 0.5–1.3)
EGFRCR SERPLBLD CKD-EPI 2021: >90 ML/MIN/1.73M*2
EOSINOPHIL # BLD AUTO: 0.07 X10*3/UL (ref 0–0.7)
EOSINOPHIL NFR BLD AUTO: 0.7 %
ERYTHROCYTE [DISTWIDTH] IN BLOOD BY AUTOMATED COUNT: 14.6 % (ref 11.5–14.5)
GLUCOSE SERPL-MCNC: 86 MG/DL (ref 74–99)
HCT VFR BLD AUTO: 45.6 % (ref 41–52)
HGB BLD-MCNC: 14.6 G/DL (ref 13.5–17.5)
IMM GRANULOCYTES # BLD AUTO: 0.03 X10*3/UL (ref 0–0.7)
IMM GRANULOCYTES NFR BLD AUTO: 0.3 % (ref 0–0.9)
LYMPHOCYTES # BLD AUTO: 1.6 X10*3/UL (ref 1.2–4.8)
LYMPHOCYTES NFR BLD AUTO: 16.1 %
MCH RBC QN AUTO: 30.7 PG (ref 26–34)
MCHC RBC AUTO-ENTMCNC: 32 G/DL (ref 32–36)
MCV RBC AUTO: 96 FL (ref 80–100)
MONOCYTES # BLD AUTO: 0.79 X10*3/UL (ref 0.1–1)
MONOCYTES NFR BLD AUTO: 8 %
NEUTROPHILS # BLD AUTO: 7.4 X10*3/UL (ref 1.2–7.7)
NEUTROPHILS NFR BLD AUTO: 74.5 %
NRBC BLD-RTO: 0 /100 WBCS (ref 0–0)
PLATELET # BLD AUTO: 244 X10*3/UL (ref 150–450)
POTASSIUM SERPL-SCNC: 4.1 MMOL/L (ref 3.5–5.3)
PROT SERPL-MCNC: 6.1 G/DL (ref 6.4–8.2)
RBC # BLD AUTO: 4.75 X10*6/UL (ref 4.5–5.9)
SODIUM SERPL-SCNC: 141 MMOL/L (ref 136–145)
TSH SERPL-ACNC: 0.35 MIU/L (ref 0.44–3.98)
WBC # BLD AUTO: 9.9 X10*3/UL (ref 4.4–11.3)

## 2024-04-15 PROCEDURE — 84443 ASSAY THYROID STIM HORMONE: CPT

## 2024-04-15 PROCEDURE — 36415 COLL VENOUS BLD VENIPUNCTURE: CPT

## 2024-04-15 PROCEDURE — 85025 COMPLETE CBC W/AUTO DIFF WBC: CPT

## 2024-04-15 PROCEDURE — 80053 COMPREHEN METABOLIC PANEL: CPT

## 2024-04-22 ENCOUNTER — LAB (OUTPATIENT)
Dept: LAB | Facility: LAB | Age: 69
End: 2024-04-22
Payer: MEDICARE

## 2024-04-22 DIAGNOSIS — R63.4 ABNORMAL WEIGHT LOSS: Primary | ICD-10-CM

## 2024-04-22 LAB
T3 SERPL-MCNC: 116 NG/DL (ref 60–200)
T4 SERPL-MCNC: 8.5 UG/DL (ref 4.5–11.1)
TSH SERPL-ACNC: 0.49 MIU/L (ref 0.44–3.98)

## 2024-04-22 PROCEDURE — 84436 ASSAY OF TOTAL THYROXINE: CPT

## 2024-04-22 PROCEDURE — 84443 ASSAY THYROID STIM HORMONE: CPT

## 2024-04-22 PROCEDURE — 36415 COLL VENOUS BLD VENIPUNCTURE: CPT

## 2024-04-22 PROCEDURE — 84480 ASSAY TRIIODOTHYRONINE (T3): CPT

## 2024-06-17 RX ORDER — ASPIRIN 81 MG/1
TABLET ORAL
COMMUNITY
End: 2024-06-25 | Stop reason: ALTCHOICE

## 2024-06-25 RX ORDER — OMEPRAZOLE 20 MG/1
CAPSULE, DELAYED RELEASE ORAL
COMMUNITY
Start: 2012-12-18

## 2024-06-25 RX ORDER — GABAPENTIN 300 MG/1
CAPSULE ORAL
COMMUNITY
Start: 2013-02-11

## 2024-06-25 RX ORDER — LOSARTAN POTASSIUM 100 MG/1
1 TABLET ORAL
COMMUNITY
Start: 2023-08-29

## 2024-06-25 RX ORDER — FLUOXETINE HYDROCHLORIDE 20 MG/1
CAPSULE ORAL
COMMUNITY
Start: 2012-12-18

## 2024-06-25 RX ORDER — METHOCARBAMOL 750 MG/1
750 TABLET, FILM COATED ORAL 4 TIMES DAILY
COMMUNITY
Start: 2023-10-09

## 2024-06-25 RX ORDER — SIMVASTATIN 40 MG/1
1 TABLET, FILM COATED ORAL
COMMUNITY
Start: 2023-09-18

## 2024-06-25 RX ORDER — MELOXICAM 15 MG/1
TABLET ORAL
COMMUNITY
Start: 2013-11-07

## 2024-06-25 RX ORDER — AMLODIPINE BESYLATE 2.5 MG/1
2.5 TABLET ORAL
COMMUNITY
Start: 2023-09-18

## 2024-06-26 ENCOUNTER — ANESTHESIA EVENT (OUTPATIENT)
Dept: GASTROENTEROLOGY | Facility: HOSPITAL | Age: 69
End: 2024-06-26
Payer: MEDICARE

## 2024-06-26 ENCOUNTER — ANESTHESIA (OUTPATIENT)
Dept: GASTROENTEROLOGY | Facility: HOSPITAL | Age: 69
End: 2024-06-26
Payer: MEDICARE

## 2024-06-26 ENCOUNTER — HOSPITAL ENCOUNTER (OUTPATIENT)
Dept: GASTROENTEROLOGY | Facility: HOSPITAL | Age: 69
Discharge: HOME | End: 2024-06-26
Payer: MEDICARE

## 2024-06-26 VITALS
TEMPERATURE: 97.3 F | SYSTOLIC BLOOD PRESSURE: 154 MMHG | RESPIRATION RATE: 18 BRPM | DIASTOLIC BLOOD PRESSURE: 83 MMHG | HEART RATE: 80 BPM | OXYGEN SATURATION: 97 %

## 2024-06-26 DIAGNOSIS — K52.9 CHRONIC DIARRHEA: Primary | ICD-10-CM

## 2024-06-26 DIAGNOSIS — R63.4 WEIGHT LOSS, ABNORMAL: ICD-10-CM

## 2024-06-26 DIAGNOSIS — Z12.11 ENCOUNTER FOR SCREENING FOR MALIGNANT NEOPLASM OF COLON: ICD-10-CM

## 2024-06-26 DIAGNOSIS — R13.10 DYSPHAGIA: ICD-10-CM

## 2024-06-26 DIAGNOSIS — E13.9 DIABETES 1.5, MANAGED AS TYPE 1 (MULTI): ICD-10-CM

## 2024-06-26 LAB — GLUCOSE BLD MANUAL STRIP-MCNC: 90 MG/DL (ref 74–99)

## 2024-06-26 PROCEDURE — 96372 THER/PROPH/DIAG INJ SC/IM: CPT | Performed by: NURSE ANESTHETIST, CERTIFIED REGISTERED

## 2024-06-26 PROCEDURE — 2500000005 HC RX 250 GENERAL PHARMACY W/O HCPCS: Performed by: NURSE ANESTHETIST, CERTIFIED REGISTERED

## 2024-06-26 PROCEDURE — 2500000004 HC RX 250 GENERAL PHARMACY W/ HCPCS (ALT 636 FOR OP/ED): Performed by: INTERNAL MEDICINE

## 2024-06-26 PROCEDURE — 7100000009 HC PHASE TWO TIME - INITIAL BASE CHARGE

## 2024-06-26 PROCEDURE — 43239 EGD BIOPSY SINGLE/MULTIPLE: CPT | Performed by: INTERNAL MEDICINE

## 2024-06-26 PROCEDURE — 3700000001 HC GENERAL ANESTHESIA TIME - INITIAL BASE CHARGE

## 2024-06-26 PROCEDURE — 7100000010 HC PHASE TWO TIME - EACH INCREMENTAL 1 MINUTE

## 2024-06-26 PROCEDURE — 82947 ASSAY GLUCOSE BLOOD QUANT: CPT

## 2024-06-26 PROCEDURE — 45380 COLONOSCOPY AND BIOPSY: CPT | Performed by: INTERNAL MEDICINE

## 2024-06-26 PROCEDURE — 3700000002 HC GENERAL ANESTHESIA TIME - EACH INCREMENTAL 1 MINUTE

## 2024-06-26 PROCEDURE — 2500000004 HC RX 250 GENERAL PHARMACY W/ HCPCS (ALT 636 FOR OP/ED): Performed by: NURSE ANESTHETIST, CERTIFIED REGISTERED

## 2024-06-26 RX ORDER — KETAMINE HCL IN NACL, ISO-OSM 100MG/10ML
SYRINGE (ML) INJECTION AS NEEDED
Status: DISCONTINUED | OUTPATIENT
Start: 2024-06-26 | End: 2024-06-26

## 2024-06-26 RX ORDER — SODIUM CHLORIDE, SODIUM LACTATE, POTASSIUM CHLORIDE, CALCIUM CHLORIDE 600; 310; 30; 20 MG/100ML; MG/100ML; MG/100ML; MG/100ML
20 INJECTION, SOLUTION INTRAVENOUS CONTINUOUS
Status: DISCONTINUED | OUTPATIENT
Start: 2024-06-26 | End: 2024-06-27 | Stop reason: HOSPADM

## 2024-06-26 RX ORDER — PROPOFOL 10 MG/ML
INJECTION, EMULSION INTRAVENOUS AS NEEDED
Status: DISCONTINUED | OUTPATIENT
Start: 2024-06-26 | End: 2024-06-26

## 2024-06-26 RX ORDER — LABETALOL HYDROCHLORIDE 5 MG/ML
INJECTION, SOLUTION INTRAVENOUS AS NEEDED
Status: DISCONTINUED | OUTPATIENT
Start: 2024-06-26 | End: 2024-06-26

## 2024-06-26 RX ORDER — LIDOCAINE HCL/PF 100 MG/5ML
SYRINGE (ML) INTRAVENOUS AS NEEDED
Status: DISCONTINUED | OUTPATIENT
Start: 2024-06-26 | End: 2024-06-26

## 2024-06-26 SDOH — HEALTH STABILITY: MENTAL HEALTH: CURRENT SMOKER: 1

## 2024-06-26 ASSESSMENT — PAIN SCALES - GENERAL
PAIN_LEVEL: 0
PAINLEVEL_OUTOF10: 0 - NO PAIN
PAINLEVEL_OUTOF10: 0 - NO PAIN

## 2024-06-26 NOTE — ANESTHESIA PREPROCEDURE EVALUATION
"Patient: Adrián Meza \"Sreekanth\"    Procedure Information       Date/Time: 06/26/24 1000    Scheduled providers: Paola Garcia MD    Procedures:       COLONOSCOPY      EGD    Location: [facility]            Relevant Problems   Anesthesia (within normal limits)      Musculoskeletal   (+) Disc degeneration, lumbosacral   (+) Lumbar canal stenosis       Clinical information reviewed:    Allergies  Meds               NPO Detail:  No data recorded     Physical Exam    Airway  Mallampati: I  TM distance: >3 FB  Neck ROM: full     Cardiovascular - normal exam     Dental - normal exam     Pulmonary - normal exam     Abdominal - normal exam             Anesthesia Plan    History of general anesthesia?: yes  History of complications of general anesthesia?: no    ASA 2     MAC     The patient is a current smoker.  Patient was previously instructed to abstain from smoking on day of procedure.  Patient did not smoke on day of procedure.    intravenous induction   Anesthetic plan and risks discussed with patient.  Use of blood products discussed with patient who consented to blood products.    Plan discussed with CRNA.      "

## 2024-06-26 NOTE — H&P
This 70-year-old man is here for evaluation of a 40 pound weight loss.  I had last seen him in 2016 when he had bloody diarrhea and he was found to have a friable polyp removed from the splenic flexure which turned out to be benign.  He has had chronic diarrhea.  He takes Metamucil for the same.  In addition he has also lost 40 pounds in the last 6 to 7 months.  He reports this after loss of his wife from lung cancer about a year ago.  He does not cook for himself himself properly and his intake has been definitely much less.  He denies any polyuria polydipsia or any diabetes.  He has no early satiety or dysphagia.  Only has diarrhea as well as lost 40 pounds of weight.  He is here endoscopy as well as colonoscopy.    Past medical history include extensive spinal surgery in the lumbar area and cervical fusion he also had a TUR.  He also had an amputation of the left big toe for peripheral vascular disease.  Apparently he developed osteomyelitis here.    Family history is positive for breast cancer in his mother denies family history of colon cancer personal history he was  about a year ago he is a heavy smoker social drinker.    Review of systems general and constitutional 40 pound weight loss.  Cardiac negative pulmonary he gets regular CAT scans done he is a heavy smoker.  GI history as above  history of TUR for benign disease neurologic extensive lumbar and cervical spine surgery.  Peripheral vascular history of amputation of the toe on the left side.  Denies claudication.  Otherwise 10 point review of system is negative.    Physical examination somewhat anxious gentleman HEENT signs of weight loss oral cavity is dry conjunctiva shows no pallor or jaundice.  Carotid 2+ heart sounds were normal chest is clear abdominal exam shows no palpable aneurysms or masses    Mental status alert and oriented times place and events neurologically grossly intact.  He appeared to be somewhat anxious and  depressed.    Extremities he has a amputation of the toe on the left side peripheral pulsation but difficult to feel.  Clinical impression chronic diarrhea ; 40 pound weight loss    Requires endoscopy small bowel biopsies colonoscopy and biopsies.  Lab studies obtained reviewed.  Informed consent was obtained.

## 2024-06-26 NOTE — H&P
"History Of Present Illness  Adrián Meza \"Sreekanth\" is a 68 y.o. male presenting with chronic diarrhea and severe weight loss of 40 pounds.     Past Medical History  Past Medical History:   Diagnosis Date    Cellulitis of left lower limb     Cellulitis of left leg    Unilateral inguinal hernia, without obstruction or gangrene, not specified as recurrent     Inguinal hernia       Surgical History  Past Surgical History:   Procedure Laterality Date    CERVICAL FUSION  06/26/2013    Cervical Vertebral Fusion    KNEE ARTHROSCOPY W/ DEBRIDEMENT  06/26/2013    Arthroscopy Knee Left    KNEE ARTHROSCOPY W/ DEBRIDEMENT  06/26/2013    Arthroscopy Knee Left    LUMBAR FUSION  06/26/2013    Lumbar Vertebral Fusion    TOTAL KNEE ARTHROPLASTY  06/26/2013    Knee Replacement        Social History  He has no history on file for tobacco use, alcohol use, and drug use.    Family History  No family history on file.     Allergies  Amoxicillin, Duloxetine, and Penicillin    Review of Systems     Physical Exam     Last Recorded Vitals  Blood pressure 156/81, pulse 82, temperature 36.3 °C (97.3 °F), temperature source Temporal, resp. rate 18, SpO2 100%.    Relevant Results               Assessment/Plan   Active Problems:  There are no active Hospital Problems.    Please refer to me notes he is being evaluated because of severe weight loss of 40 pounds and chronic diarrhea.  He needs upper endoscopy with small bowel biopsy as well as colonoscopy and biopsy to rule out inflammatory bowel disease or malabsorption or neoplasm.           I spent   20 minutes in the professional and overall care of this patient.      Paola Garcia MD    "

## 2024-06-26 NOTE — ANESTHESIA POSTPROCEDURE EVALUATION
"Patient: Adrián Meza \"Sreekanth\"    Procedure Summary       Date: 06/26/24 Room / Location: [facility]    Anesthesia Start: 1020 Anesthesia Stop: 1122    Procedures:       COLONOSCOPY      EGD Diagnosis:       Encounter for screening for malignant neoplasm of colon      Dysphagia    Scheduled Providers: Paola Garcia MD Responsible Provider: Agusto Hammond MD    Anesthesia Type: MAC ASA Status: 2            Anesthesia Type: MAC    Vitals Value Taken Time   /81 06/26/24 1125   Temp 36.3 06/26/24 1125   Pulse 84 06/26/24 1125   Resp 16 06/26/24 1125   SpO2 100 06/26/24 1125       Anesthesia Post Evaluation    Patient location during evaluation: PACU  Patient participation: complete - patient participated  Level of consciousness: awake and alert  Pain score: 0  Pain management: adequate  Airway patency: patent  Cardiovascular status: acceptable  Respiratory status: acceptable  Hydration status: acceptable  Postoperative Nausea and Vomiting: none        No notable events documented.    "

## 2024-06-26 NOTE — DISCHARGE INSTRUCTIONS

## 2024-07-01 ENCOUNTER — LAB (OUTPATIENT)
Dept: LAB | Facility: LAB | Age: 69
End: 2024-07-01
Payer: MEDICARE

## 2024-07-01 DIAGNOSIS — E05.90 THYROTOXICOSIS, UNSPECIFIED WITHOUT THYROTOXIC CRISIS OR STORM: ICD-10-CM

## 2024-07-01 DIAGNOSIS — R63.8 OTHER SYMPTOMS AND SIGNS CONCERNING FOOD AND FLUID INTAKE: ICD-10-CM

## 2024-07-01 DIAGNOSIS — E11.9 TYPE 2 DIABETES MELLITUS WITHOUT COMPLICATIONS (MULTI): Primary | ICD-10-CM

## 2024-07-01 LAB
EST. AVERAGE GLUCOSE BLD GHB EST-MCNC: 111 MG/DL
HBA1C MFR BLD: 5.5 %
T4 FREE SERPL-MCNC: 0.96 NG/DL (ref 0.61–1.12)
TSH SERPL-ACNC: 0.55 MIU/L (ref 0.44–3.98)

## 2024-07-01 PROCEDURE — 84443 ASSAY THYROID STIM HORMONE: CPT

## 2024-07-01 PROCEDURE — 83036 HEMOGLOBIN GLYCOSYLATED A1C: CPT

## 2024-07-01 PROCEDURE — 36415 COLL VENOUS BLD VENIPUNCTURE: CPT

## 2024-07-01 PROCEDURE — 84439 ASSAY OF FREE THYROXINE: CPT

## 2024-07-05 LAB
LABORATORY COMMENT REPORT: NORMAL
PATH REPORT.FINAL DX SPEC: NORMAL
PATH REPORT.GROSS SPEC: NORMAL
PATH REPORT.RELEVANT HX SPEC: NORMAL
PATH REPORT.TOTAL CANCER: NORMAL

## 2024-10-16 ENCOUNTER — HOSPITAL ENCOUNTER (INPATIENT)
Facility: HOSPITAL | Age: 69
LOS: 1 days | Discharge: HOME | DRG: 394 | End: 2024-10-17
Attending: EMERGENCY MEDICINE | Admitting: INTERNAL MEDICINE
Payer: MEDICARE

## 2024-10-16 ENCOUNTER — APPOINTMENT (OUTPATIENT)
Dept: RADIOLOGY | Facility: HOSPITAL | Age: 69
DRG: 394 | End: 2024-10-16
Payer: MEDICARE

## 2024-10-16 DIAGNOSIS — K40.30 INCARCERATED LEFT INGUINAL HERNIA: ICD-10-CM

## 2024-10-16 DIAGNOSIS — R10.32 LEFT LOWER QUADRANT PAIN: ICD-10-CM

## 2024-10-16 DIAGNOSIS — K56.609 SMALL BOWEL OBSTRUCTION (MULTI): Primary | ICD-10-CM

## 2024-10-16 LAB
ALBUMIN SERPL BCP-MCNC: 5 G/DL (ref 3.4–5)
ALP SERPL-CCNC: 97 U/L (ref 33–136)
ALT SERPL W P-5'-P-CCNC: 18 U/L (ref 10–52)
ANION GAP SERPL CALC-SCNC: 17 MMOL/L (ref 10–20)
APPEARANCE UR: CLEAR
AST SERPL W P-5'-P-CCNC: 21 U/L (ref 9–39)
BASOPHILS # BLD AUTO: 0.03 X10*3/UL (ref 0–0.1)
BASOPHILS NFR BLD AUTO: 0.2 %
BILIRUB SERPL-MCNC: 1.1 MG/DL (ref 0–1.2)
BILIRUB UR STRIP.AUTO-MCNC: NEGATIVE MG/DL
BUN SERPL-MCNC: 11 MG/DL (ref 6–23)
CALCIUM SERPL-MCNC: 10.4 MG/DL (ref 8.6–10.3)
CHLORIDE SERPL-SCNC: 98 MMOL/L (ref 98–107)
CO2 SERPL-SCNC: 26 MMOL/L (ref 21–32)
COLOR UR: COLORLESS
CREAT SERPL-MCNC: 0.77 MG/DL (ref 0.5–1.3)
EGFRCR SERPLBLD CKD-EPI 2021: >90 ML/MIN/1.73M*2
EOSINOPHIL # BLD AUTO: 0 X10*3/UL (ref 0–0.7)
EOSINOPHIL NFR BLD AUTO: 0 %
ERYTHROCYTE [DISTWIDTH] IN BLOOD BY AUTOMATED COUNT: 13.9 % (ref 11.5–14.5)
GLUCOSE SERPL-MCNC: 128 MG/DL (ref 74–99)
GLUCOSE UR STRIP.AUTO-MCNC: NORMAL MG/DL
HCT VFR BLD AUTO: 48.9 % (ref 41–52)
HGB BLD-MCNC: 16.7 G/DL (ref 13.5–17.5)
IMM GRANULOCYTES # BLD AUTO: 0.1 X10*3/UL (ref 0–0.7)
IMM GRANULOCYTES NFR BLD AUTO: 0.6 % (ref 0–0.9)
KETONES UR STRIP.AUTO-MCNC: ABNORMAL MG/DL
LACTATE SERPL-SCNC: 1.8 MMOL/L (ref 0.4–2)
LACTATE SERPL-SCNC: 2.3 MMOL/L (ref 0.4–2)
LEUKOCYTE ESTERASE UR QL STRIP.AUTO: NEGATIVE
LIPASE SERPL-CCNC: 18 U/L (ref 9–82)
LYMPHOCYTES # BLD AUTO: 0.6 X10*3/UL (ref 1.2–4.8)
LYMPHOCYTES NFR BLD AUTO: 3.8 %
MCH RBC QN AUTO: 31.6 PG (ref 26–34)
MCHC RBC AUTO-ENTMCNC: 34.2 G/DL (ref 32–36)
MCV RBC AUTO: 92 FL (ref 80–100)
MONOCYTES # BLD AUTO: 0.76 X10*3/UL (ref 0.1–1)
MONOCYTES NFR BLD AUTO: 4.8 %
NEUTROPHILS # BLD AUTO: 14.3 X10*3/UL (ref 1.2–7.7)
NEUTROPHILS NFR BLD AUTO: 90.6 %
NITRITE UR QL STRIP.AUTO: NEGATIVE
NRBC BLD-RTO: 0 /100 WBCS (ref 0–0)
PH UR STRIP.AUTO: 8.5 [PH]
PLATELET # BLD AUTO: 238 X10*3/UL (ref 150–450)
POTASSIUM SERPL-SCNC: 3.5 MMOL/L (ref 3.5–5.3)
PROT SERPL-MCNC: 8 G/DL (ref 6.4–8.2)
PROT UR STRIP.AUTO-MCNC: ABNORMAL MG/DL
RBC # BLD AUTO: 5.29 X10*6/UL (ref 4.5–5.9)
RBC # UR STRIP.AUTO: ABNORMAL /UL
RBC #/AREA URNS AUTO: ABNORMAL /HPF
SODIUM SERPL-SCNC: 137 MMOL/L (ref 136–145)
SP GR UR STRIP.AUTO: 1.04
UROBILINOGEN UR STRIP.AUTO-MCNC: NORMAL MG/DL
WBC # BLD AUTO: 15.8 X10*3/UL (ref 4.4–11.3)
WBC #/AREA URNS AUTO: ABNORMAL /HPF

## 2024-10-16 PROCEDURE — 2550000001 HC RX 255 CONTRASTS: Performed by: EMERGENCY MEDICINE

## 2024-10-16 PROCEDURE — 2500000004 HC RX 250 GENERAL PHARMACY W/ HCPCS (ALT 636 FOR OP/ED): Performed by: EMERGENCY MEDICINE

## 2024-10-16 PROCEDURE — 36415 COLL VENOUS BLD VENIPUNCTURE: CPT | Performed by: NURSE PRACTITIONER

## 2024-10-16 PROCEDURE — 2500000004 HC RX 250 GENERAL PHARMACY W/ HCPCS (ALT 636 FOR OP/ED)

## 2024-10-16 PROCEDURE — 1100000001 HC PRIVATE ROOM DAILY

## 2024-10-16 PROCEDURE — 81001 URINALYSIS AUTO W/SCOPE: CPT | Performed by: NURSE PRACTITIONER

## 2024-10-16 PROCEDURE — 96365 THER/PROPH/DIAG IV INF INIT: CPT | Mod: 59

## 2024-10-16 PROCEDURE — 99223 1ST HOSP IP/OBS HIGH 75: CPT | Performed by: SURGERY

## 2024-10-16 PROCEDURE — 96375 TX/PRO/DX INJ NEW DRUG ADDON: CPT

## 2024-10-16 PROCEDURE — 96367 TX/PROPH/DG ADDL SEQ IV INF: CPT

## 2024-10-16 PROCEDURE — 74177 CT ABD & PELVIS W/CONTRAST: CPT | Performed by: STUDENT IN AN ORGANIZED HEALTH CARE EDUCATION/TRAINING PROGRAM

## 2024-10-16 PROCEDURE — 99285 EMERGENCY DEPT VISIT HI MDM: CPT

## 2024-10-16 PROCEDURE — 83605 ASSAY OF LACTIC ACID: CPT | Performed by: NURSE PRACTITIONER

## 2024-10-16 PROCEDURE — G0378 HOSPITAL OBSERVATION PER HR: HCPCS

## 2024-10-16 PROCEDURE — 80053 COMPREHEN METABOLIC PANEL: CPT | Performed by: NURSE PRACTITIONER

## 2024-10-16 PROCEDURE — 83690 ASSAY OF LIPASE: CPT | Performed by: NURSE PRACTITIONER

## 2024-10-16 PROCEDURE — 96376 TX/PRO/DX INJ SAME DRUG ADON: CPT

## 2024-10-16 PROCEDURE — 74177 CT ABD & PELVIS W/CONTRAST: CPT

## 2024-10-16 PROCEDURE — 85025 COMPLETE CBC W/AUTO DIFF WBC: CPT | Performed by: NURSE PRACTITIONER

## 2024-10-16 RX ORDER — MORPHINE SULFATE 2 MG/ML
2 INJECTION, SOLUTION INTRAMUSCULAR; INTRAVENOUS EVERY 4 HOURS PRN
Status: DISCONTINUED | OUTPATIENT
Start: 2024-10-16 | End: 2024-10-17 | Stop reason: HOSPADM

## 2024-10-16 RX ORDER — DEXTROSE 50 % IN WATER (D50W) INTRAVENOUS SYRINGE
12.5
Status: DISCONTINUED | OUTPATIENT
Start: 2024-10-16 | End: 2024-10-17 | Stop reason: HOSPADM

## 2024-10-16 RX ORDER — HEPARIN SODIUM 5000 [USP'U]/ML
5000 INJECTION, SOLUTION INTRAVENOUS; SUBCUTANEOUS EVERY 8 HOURS SCHEDULED
Status: DISCONTINUED | OUTPATIENT
Start: 2024-10-16 | End: 2024-10-17 | Stop reason: HOSPADM

## 2024-10-16 RX ORDER — MORPHINE SULFATE 4 MG/ML
4 INJECTION, SOLUTION INTRAMUSCULAR; INTRAVENOUS EVERY 4 HOURS PRN
Status: DISCONTINUED | OUTPATIENT
Start: 2024-10-16 | End: 2024-10-17 | Stop reason: HOSPADM

## 2024-10-16 RX ORDER — METRONIDAZOLE 500 MG/100ML
500 INJECTION, SOLUTION INTRAVENOUS ONCE
Status: COMPLETED | OUTPATIENT
Start: 2024-10-16 | End: 2024-10-16

## 2024-10-16 RX ORDER — CEFTRIAXONE 2 G/50ML
2 INJECTION, SOLUTION INTRAVENOUS ONCE
Status: COMPLETED | OUTPATIENT
Start: 2024-10-16 | End: 2024-10-16

## 2024-10-16 RX ORDER — ONDANSETRON HYDROCHLORIDE 2 MG/ML
4 INJECTION, SOLUTION INTRAVENOUS ONCE
Status: COMPLETED | OUTPATIENT
Start: 2024-10-16 | End: 2024-10-16

## 2024-10-16 RX ORDER — ACETAMINOPHEN 325 MG/1
650 TABLET ORAL EVERY 4 HOURS PRN
Status: DISCONTINUED | OUTPATIENT
Start: 2024-10-16 | End: 2024-10-17 | Stop reason: HOSPADM

## 2024-10-16 RX ORDER — INSULIN LISPRO 100 [IU]/ML
0-10 INJECTION, SOLUTION INTRAVENOUS; SUBCUTANEOUS
Status: DISCONTINUED | OUTPATIENT
Start: 2024-10-17 | End: 2024-10-17 | Stop reason: HOSPADM

## 2024-10-16 RX ORDER — MORPHINE SULFATE 4 MG/ML
4 INJECTION, SOLUTION INTRAMUSCULAR; INTRAVENOUS ONCE
Status: COMPLETED | OUTPATIENT
Start: 2024-10-16 | End: 2024-10-16

## 2024-10-16 RX ORDER — DEXTROSE 50 % IN WATER (D50W) INTRAVENOUS SYRINGE
25
Status: DISCONTINUED | OUTPATIENT
Start: 2024-10-16 | End: 2024-10-17 | Stop reason: HOSPADM

## 2024-10-16 RX ORDER — ONDANSETRON HYDROCHLORIDE 2 MG/ML
4 INJECTION, SOLUTION INTRAVENOUS EVERY 4 HOURS PRN
Status: DISCONTINUED | OUTPATIENT
Start: 2024-10-16 | End: 2024-10-17 | Stop reason: HOSPADM

## 2024-10-16 RX ORDER — KETOROLAC TROMETHAMINE 30 MG/ML
15 INJECTION, SOLUTION INTRAMUSCULAR; INTRAVENOUS ONCE
Status: DISCONTINUED | OUTPATIENT
Start: 2024-10-16 | End: 2024-10-16

## 2024-10-16 RX ORDER — POLYETHYLENE GLYCOL 3350 17 G/17G
17 POWDER, FOR SOLUTION ORAL DAILY
Status: DISCONTINUED | OUTPATIENT
Start: 2024-10-17 | End: 2024-10-17 | Stop reason: HOSPADM

## 2024-10-16 RX ORDER — CEFTRIAXONE 1 G/50ML
1 INJECTION, SOLUTION INTRAVENOUS EVERY 24 HOURS
Status: DISCONTINUED | OUTPATIENT
Start: 2024-10-17 | End: 2024-10-17 | Stop reason: HOSPADM

## 2024-10-16 RX ORDER — METRONIDAZOLE 500 MG/100ML
500 INJECTION, SOLUTION INTRAVENOUS EVERY 8 HOURS
Status: DISCONTINUED | OUTPATIENT
Start: 2024-10-17 | End: 2024-10-17 | Stop reason: HOSPADM

## 2024-10-16 SDOH — HEALTH STABILITY: PHYSICAL HEALTH
HOW OFTEN DO YOU NEED TO HAVE SOMEONE HELP YOU WHEN YOU READ INSTRUCTIONS, PAMPHLETS, OR OTHER WRITTEN MATERIAL FROM YOUR DOCTOR OR PHARMACY?: NEVER

## 2024-10-16 SDOH — ECONOMIC STABILITY: FOOD INSECURITY: HOW HARD IS IT FOR YOU TO PAY FOR THE VERY BASICS LIKE FOOD, HOUSING, MEDICAL CARE, AND HEATING?: SOMEWHAT HARD

## 2024-10-16 SDOH — SOCIAL STABILITY: SOCIAL INSECURITY: ARE YOU MARRIED, WIDOWED, DIVORCED, SEPARATED, NEVER MARRIED, OR LIVING WITH A PARTNER?: WIDOWED

## 2024-10-16 SDOH — SOCIAL STABILITY: SOCIAL INSECURITY: WITHIN THE LAST YEAR, HAVE YOU BEEN HUMILIATED OR EMOTIONALLY ABUSED IN OTHER WAYS BY YOUR PARTNER OR EX-PARTNER?: NO

## 2024-10-16 SDOH — SOCIAL STABILITY: SOCIAL INSECURITY
WITHIN THE LAST YEAR, HAVE YOU BEEN RAPED OR FORCED TO HAVE ANY KIND OF SEXUAL ACTIVITY BY YOUR PARTNER OR EX-PARTNER?: NO

## 2024-10-16 SDOH — ECONOMIC STABILITY: INCOME INSECURITY: IN THE PAST 12 MONTHS HAS THE ELECTRIC, GAS, OIL, OR WATER COMPANY THREATENED TO SHUT OFF SERVICES IN YOUR HOME?: NO

## 2024-10-16 SDOH — SOCIAL STABILITY: SOCIAL INSECURITY: WITHIN THE LAST YEAR, HAVE YOU BEEN AFRAID OF YOUR PARTNER OR EX-PARTNER?: NO

## 2024-10-16 SDOH — ECONOMIC STABILITY: FOOD INSECURITY: WITHIN THE PAST 12 MONTHS, THE FOOD YOU BOUGHT JUST DIDN'T LAST AND YOU DIDN'T HAVE MONEY TO GET MORE.: NEVER TRUE

## 2024-10-16 SDOH — SOCIAL STABILITY: SOCIAL INSECURITY: ABUSE: ADULT

## 2024-10-16 SDOH — ECONOMIC STABILITY: HOUSING INSECURITY: IN THE LAST 12 MONTHS, WAS THERE A TIME WHEN YOU WERE NOT ABLE TO PAY THE MORTGAGE OR RENT ON TIME?: NO

## 2024-10-16 SDOH — SOCIAL STABILITY: SOCIAL INSECURITY: DOES ANYONE TRY TO KEEP YOU FROM HAVING/CONTACTING OTHER FRIENDS OR DOING THINGS OUTSIDE YOUR HOME?: NO

## 2024-10-16 SDOH — ECONOMIC STABILITY: HOUSING INSECURITY: AT ANY TIME IN THE PAST 12 MONTHS, WERE YOU HOMELESS OR LIVING IN A SHELTER (INCLUDING NOW)?: NO

## 2024-10-16 SDOH — HEALTH STABILITY: PHYSICAL HEALTH: ON AVERAGE, HOW MANY MINUTES DO YOU ENGAGE IN EXERCISE AT THIS LEVEL?: 0 MIN

## 2024-10-16 SDOH — SOCIAL STABILITY: SOCIAL NETWORK: HOW OFTEN DO YOU ATTEND MEETINGS OF THE CLUBS OR ORGANIZATIONS YOU BELONG TO?: NEVER

## 2024-10-16 SDOH — SOCIAL STABILITY: SOCIAL INSECURITY: HAVE YOU HAD THOUGHTS OF HARMING ANYONE ELSE?: NO

## 2024-10-16 SDOH — SOCIAL STABILITY: SOCIAL INSECURITY: WERE YOU ABLE TO COMPLETE ALL THE BEHAVIORAL HEALTH SCREENINGS?: YES

## 2024-10-16 SDOH — HEALTH STABILITY: MENTAL HEALTH
DO YOU FEEL STRESS - TENSE, RESTLESS, NERVOUS, OR ANXIOUS, OR UNABLE TO SLEEP AT NIGHT BECAUSE YOUR MIND IS TROUBLED ALL THE TIME - THESE DAYS?: TO SOME EXTENT

## 2024-10-16 SDOH — SOCIAL STABILITY: SOCIAL NETWORK
DO YOU BELONG TO ANY CLUBS OR ORGANIZATIONS SUCH AS CHURCH GROUPS, UNIONS, FRATERNAL OR ATHLETIC GROUPS, OR SCHOOL GROUPS?: NO

## 2024-10-16 SDOH — HEALTH STABILITY: PHYSICAL HEALTH: ON AVERAGE, HOW MANY DAYS PER WEEK DO YOU ENGAGE IN MODERATE TO STRENUOUS EXERCISE (LIKE A BRISK WALK)?: 0 DAYS

## 2024-10-16 SDOH — SOCIAL STABILITY: SOCIAL INSECURITY
WITHIN THE LAST YEAR, HAVE YOU BEEN KICKED, HIT, SLAPPED, OR OTHERWISE PHYSICALLY HURT BY YOUR PARTNER OR EX-PARTNER?: NO

## 2024-10-16 SDOH — SOCIAL STABILITY: SOCIAL INSECURITY: HAVE YOU HAD ANY THOUGHTS OF HARMING ANYONE ELSE?: NO

## 2024-10-16 SDOH — SOCIAL STABILITY: SOCIAL NETWORK: HOW OFTEN DO YOU GET TOGETHER WITH FRIENDS OR RELATIVES?: TWICE A WEEK

## 2024-10-16 SDOH — ECONOMIC STABILITY: FOOD INSECURITY: WITHIN THE PAST 12 MONTHS, YOU WORRIED THAT YOUR FOOD WOULD RUN OUT BEFORE YOU GOT THE MONEY TO BUY MORE.: NEVER TRUE

## 2024-10-16 SDOH — SOCIAL STABILITY: SOCIAL INSECURITY: HAS ANYONE EVER THREATENED TO HURT YOUR FAMILY OR YOUR PETS?: NO

## 2024-10-16 SDOH — ECONOMIC STABILITY: HOUSING INSECURITY: IN THE PAST 12 MONTHS, HOW MANY TIMES HAVE YOU MOVED WHERE YOU WERE LIVING?: 1

## 2024-10-16 SDOH — SOCIAL STABILITY: SOCIAL INSECURITY: DO YOU FEEL UNSAFE GOING BACK TO THE PLACE WHERE YOU ARE LIVING?: NO

## 2024-10-16 SDOH — SOCIAL STABILITY: SOCIAL INSECURITY: DO YOU FEEL ANYONE HAS EXPLOITED OR TAKEN ADVANTAGE OF YOU FINANCIALLY OR OF YOUR PERSONAL PROPERTY?: NO

## 2024-10-16 SDOH — SOCIAL STABILITY: SOCIAL INSECURITY: ARE THERE ANY APPARENT SIGNS OF INJURIES/BEHAVIORS THAT COULD BE RELATED TO ABUSE/NEGLECT?: NO

## 2024-10-16 SDOH — SOCIAL STABILITY: SOCIAL NETWORK: HOW OFTEN DO YOU ATTEND CHURCH OR RELIGIOUS SERVICES?: 1 TO 4 TIMES PER YEAR

## 2024-10-16 SDOH — SOCIAL STABILITY: SOCIAL INSECURITY: ARE YOU OR HAVE YOU BEEN THREATENED OR ABUSED PHYSICALLY, EMOTIONALLY, OR SEXUALLY BY ANYONE?: NO

## 2024-10-16 SDOH — ECONOMIC STABILITY: TRANSPORTATION INSECURITY: IN THE PAST 12 MONTHS, HAS LACK OF TRANSPORTATION KEPT YOU FROM MEDICAL APPOINTMENTS OR FROM GETTING MEDICATIONS?: NO

## 2024-10-16 SDOH — SOCIAL STABILITY: SOCIAL NETWORK: IN A TYPICAL WEEK, HOW MANY TIMES DO YOU TALK ON THE PHONE WITH FAMILY, FRIENDS, OR NEIGHBORS?: ONCE A WEEK

## 2024-10-16 ASSESSMENT — PAIN SCALES - GENERAL
PAINLEVEL_OUTOF10: 3
PAINLEVEL_OUTOF10: 7
PAINLEVEL_OUTOF10: 3
PAINLEVEL_OUTOF10: 5 - MODERATE PAIN

## 2024-10-16 ASSESSMENT — COLUMBIA-SUICIDE SEVERITY RATING SCALE - C-SSRS
1. IN THE PAST MONTH, HAVE YOU WISHED YOU WERE DEAD OR WISHED YOU COULD GO TO SLEEP AND NOT WAKE UP?: NO
6. HAVE YOU EVER DONE ANYTHING, STARTED TO DO ANYTHING, OR PREPARED TO DO ANYTHING TO END YOUR LIFE?: NO
2. HAVE YOU ACTUALLY HAD ANY THOUGHTS OF KILLING YOURSELF?: NO
2. HAVE YOU ACTUALLY HAD ANY THOUGHTS OF KILLING YOURSELF?: NO
6. HAVE YOU EVER DONE ANYTHING, STARTED TO DO ANYTHING, OR PREPARED TO DO ANYTHING TO END YOUR LIFE?: NO

## 2024-10-16 ASSESSMENT — LIFESTYLE VARIABLES
SUBSTANCE_ABUSE_PAST_12_MONTHS: NO
AUDIT-C TOTAL SCORE: 0
AUDIT-C TOTAL SCORE: 0
HOW OFTEN DO YOU HAVE A DRINK CONTAINING ALCOHOL: NEVER
HAVE PEOPLE ANNOYED YOU BY CRITICIZING YOUR DRINKING: NO
EVER FELT BAD OR GUILTY ABOUT YOUR DRINKING: NO
HOW MANY STANDARD DRINKS CONTAINING ALCOHOL DO YOU HAVE ON A TYPICAL DAY: PATIENT DOES NOT DRINK
HAVE YOU EVER FELT YOU SHOULD CUT DOWN ON YOUR DRINKING: NO
TOTAL SCORE: 0
HOW OFTEN DO YOU HAVE 6 OR MORE DRINKS ON ONE OCCASION: NEVER
SKIP TO QUESTIONS 9-10: 1
EVER HAD A DRINK FIRST THING IN THE MORNING TO STEADY YOUR NERVES TO GET RID OF A HANGOVER: NO
PRESCIPTION_ABUSE_PAST_12_MONTHS: NO

## 2024-10-16 ASSESSMENT — COGNITIVE AND FUNCTIONAL STATUS - GENERAL
MOBILITY SCORE: 24
DAILY ACTIVITIY SCORE: 24
PATIENT BASELINE BEDBOUND: NO

## 2024-10-16 ASSESSMENT — ACTIVITIES OF DAILY LIVING (ADL)
HEARING - LEFT EAR: FUNCTIONAL
LACK_OF_TRANSPORTATION: NO
HEARING - RIGHT EAR: FUNCTIONAL
WALKS IN HOME: INDEPENDENT
BATHING: INDEPENDENT
LACK_OF_TRANSPORTATION: NO
PATIENT'S MEMORY ADEQUATE TO SAFELY COMPLETE DAILY ACTIVITIES?: YES
JUDGMENT_ADEQUATE_SAFELY_COMPLETE_DAILY_ACTIVITIES: YES
DRESSING YOURSELF: INDEPENDENT
ADEQUATE_TO_COMPLETE_ADL: YES
FEEDING YOURSELF: INDEPENDENT
TOILETING: INDEPENDENT
GROOMING: INDEPENDENT

## 2024-10-16 ASSESSMENT — PAIN - FUNCTIONAL ASSESSMENT
PAIN_FUNCTIONAL_ASSESSMENT: 0-10
PAIN_FUNCTIONAL_ASSESSMENT: 0-10

## 2024-10-16 ASSESSMENT — PATIENT HEALTH QUESTIONNAIRE - PHQ9
SUM OF ALL RESPONSES TO PHQ9 QUESTIONS 1 & 2: 4
1. LITTLE INTEREST OR PLEASURE IN DOING THINGS: MORE THAN HALF THE DAYS
2. FEELING DOWN, DEPRESSED OR HOPELESS: MORE THAN HALF THE DAYS

## 2024-10-16 ASSESSMENT — PAIN DESCRIPTION - PAIN TYPE: TYPE: ACUTE PAIN

## 2024-10-16 ASSESSMENT — PAIN DESCRIPTION - ORIENTATION
ORIENTATION: LEFT;LOWER
ORIENTATION: LEFT
ORIENTATION: LEFT

## 2024-10-16 ASSESSMENT — PAIN DESCRIPTION - LOCATION
LOCATION: ABDOMEN

## 2024-10-16 NOTE — ED PROVIDER NOTES
EMERGENCY DEPARTMENT ENCOUNTER      Pt Name: Adrián Meza  MRN: [id number]  Birthdate 1955  Date of evaluation: 10/16/2024  Provider: Oniel Cooper DO    CHIEF COMPLAINT       Chief Complaint   Patient presents with    Abdominal Pain     States that he ate breakfast and has been having stomach pain dizziness and vomiting. Denies passing any gas. States that he does have 2 inguinal hernias and one needs repaired. Last BM was this morning. Is able to keep sips of coke down without any issue at this time.     HISTORY OF PRESENT ILLNESS    Adrián Meza is a 69 y.o. year old male who presents to the ER for abdominal pain.  States this morning at 830 after having breakfast he started having abdominal pain at the site of his left inguinal hernia.  Last passed gas at that time, no further flatus since then.  Denies stool changes, chest pain, shortness of breath, fever, fainting.  Does endorse associated nausea/vomiting, vomiting is nonbloody.    States he has never been able to reduce his hernia.    PMH NIDDM, HTN, HLD, nephrolithiasis  PSH left first toe amputation, no intra-abdominal procedures  Allergies to penicillin, duloxetine  Former alcohol use, former tobacco use, former drug use, never IV drug use     PAST MEDICAL HISTORY     Past Medical History:   Diagnosis Date    Cellulitis of left lower limb     Cellulitis of left leg    Unilateral inguinal hernia, without obstruction or gangrene, not specified as recurrent     Inguinal hernia     CURRENT MEDICATIONS       Previous Medications    AMLODIPINE (NORVASC) 2.5 MG TABLET    Take 1 tablet (2.5 mg) by mouth once daily.    FLUOXETINE (PROZAC) 20 MG CAPSULE    fluoxetine 20 mg capsule    GABAPENTIN (NEURONTIN) 300 MG CAPSULE        LOSARTAN (COZAAR) 100 MG TABLET    Take 1 tablet (100 mg) by mouth once daily.    MELOXICAM (MOBIC) 15 MG TABLET    Take by mouth.    METHOCARBAMOL (ROBAXIN) 750 MG TABLET    Take 1 tablet (750 mg) by mouth 4 times a day.     OMEPRAZOLE (PRILOSEC) 20 MG DR CAPSULE    omeprazole 20 mg capsule,delayed release    SIMVASTATIN (ZOCOR) 40 MG TABLET    Take 1 tablet (40 mg) by mouth once daily.     SURGICAL HISTORY       Past Surgical History:   Procedure Laterality Date    CERVICAL FUSION  06/26/2013    Cervical Vertebral Fusion    KNEE ARTHROSCOPY W/ DEBRIDEMENT  06/26/2013    Arthroscopy Knee Left    KNEE ARTHROSCOPY W/ DEBRIDEMENT  06/26/2013    Arthroscopy Knee Left    LUMBAR FUSION  06/26/2013    Lumbar Vertebral Fusion    TOTAL KNEE ARTHROPLASTY  06/26/2013    Knee Replacement     ALLERGIES     Amoxicillin, Duloxetine, and Penicillin  FAMILY HISTORY     No family history on file.  SOCIAL HISTORY       Social History     Tobacco Use    Smoking status: Not on file    Smokeless tobacco: Not on file   Substance Use Topics    Alcohol use: Not on file    Drug use: Not on file     PHYSICAL EXAM  (up to 7 for level 4, 8 or more for level 5)     ED Triage Vitals [10/16/24 1529]   Temperature Heart Rate Respirations BP   36 °C (96.8 °F) 74 16 (!) 187/90      Pulse Ox Temp Source Heart Rate Source Patient Position   100 % Temporal Monitor Sitting      BP Location FiO2 (%)     Left arm --       Physical Exam  Vitals and nursing note reviewed.   Constitutional:       General: He is not in acute distress.     Appearance: Normal appearance. He is not ill-appearing.   HENT:      Head: Normocephalic and atraumatic. No raccoon eyes, Arias's sign, contusion or laceration.      Jaw: No trismus or malocclusion.      Right Ear: External ear normal.      Left Ear: External ear normal.      Mouth/Throat:      Mouth: Mucous membranes are moist.      Pharynx: Oropharynx is clear.   Eyes:      Extraocular Movements: Extraocular movements intact.      Pupils: Pupils are equal, round, and reactive to light.   Neck:      Trachea: No tracheal deviation.   Cardiovascular:      Rate and Rhythm: Normal rate and regular rhythm.      Pulses: Normal pulses.   Pulmonary:       Effort: No respiratory distress.      Breath sounds: No wheezing, rhonchi or rales.   Chest:      Chest wall: No tenderness.   Abdominal:      General: Abdomen is flat.      Palpations: Abdomen is soft. There is no mass.      Tenderness: There is abdominal tenderness in the left upper quadrant and left lower quadrant.      Hernia: A hernia is present. Hernia is present in the left inguinal area (TTP without overlying skin erythema or induration) and right inguinal area.   Genitourinary:     Penis: Normal.    Musculoskeletal:         General: No tenderness or signs of injury.      Right lower leg: No edema.      Left lower leg: No edema.   Skin:     Coloration: Skin is not jaundiced or pale.      Findings: No petechiae, rash or wound.   Neurological:      Mental Status: He is alert.   Psychiatric:         Speech: Speech normal.         Thought Content: Thought content does not include homicidal or suicidal ideation.        DIAGNOSTIC RESULTS   LABS:  Labs Reviewed   LACTATE - Abnormal       Result Value    Lactate 2.3 (*)     Narrative:     Venipuncture immediately after or during the administration of Metamizole may lead to falsely low results. Testing should be performed immediately prior to Metamizole dosing.   COMPREHENSIVE METABOLIC PANEL - Abnormal    Glucose 128 (*)     Sodium 137      Potassium 3.5      Chloride 98      Bicarbonate 26      Anion Gap 17      Urea Nitrogen 11      Creatinine 0.77      eGFR >90      Calcium 10.4 (*)     Albumin 5.0      Alkaline Phosphatase 97      Total Protein 8.0      AST 21      Bilirubin, Total 1.1      ALT 18     CBC WITH AUTO DIFFERENTIAL - Abnormal    WBC 15.8 (*)     nRBC 0.0      RBC 5.29      Hemoglobin 16.7      Hematocrit 48.9      MCV 92      MCH 31.6      MCHC 34.2      RDW 13.9      Platelets 238      Neutrophils % 90.6      Immature Granulocytes %, Automated 0.6      Lymphocytes % 3.8      Monocytes % 4.8      Eosinophils % 0.0      Basophils % 0.2       Neutrophils Absolute 14.30 (*)     Immature Granulocytes Absolute, Automated 0.10      Lymphocytes Absolute 0.60 (*)     Monocytes Absolute 0.76      Eosinophils Absolute 0.00      Basophils Absolute 0.03     URINALYSIS WITH REFLEX CULTURE AND MICROSCOPIC - Abnormal    Color, Urine Colorless (*)     Appearance, Urine Clear      Specific Gravity, Urine 1.037 (*)     pH, Urine 8.5 (*)     Protein, Urine 50 (1+) (*)     Glucose, Urine Normal      Blood, Urine 0.03 (TRACE) (*)     Ketones, Urine 40 (2+) (*)     Bilirubin, Urine NEGATIVE      Urobilinogen, Urine Normal      Nitrite, Urine NEGATIVE      Leukocyte Esterase, Urine NEGATIVE     URINALYSIS MICROSCOPIC WITH REFLEX CULTURE - Abnormal    WBC, Urine 1-5      RBC, Urine 6-10 (*)    LIPASE - Normal    Lipase 18      Narrative:     Venipuncture immediately after or during the administration of Metamizole may lead to falsely low results. Testing should be performed immediately prior to Metamizole dosing.   LACTATE - Normal    Lactate 1.8      Narrative:     Venipuncture immediately after or during the administration of Metamizole may lead to falsely low results. Testing should be performed immediately prior to Metamizole dosing.   URINALYSIS WITH REFLEX CULTURE AND MICROSCOPIC    Narrative:     The following orders were created for panel order Urinalysis with Reflex Culture and Microscopic.  Procedure                               Abnormality         Status                     ---------                               -----------         ------                     Urinalysis with Reflex C...[[id number]]  Abnormal            Final result               Extra Urine Gray Tube[[id number]]                            In process                   Please view results for these tests on the individual orders.   EXTRA URINE GRAY TUBE     All other labs were within normal range or not returned as of this dictation.  Imaging  CT abdomen pelvis w IV contrast   Final Result  "  Incarcerated small bowel within left inguinal hernia with upstream   mid small bowel obstruction. Surgical consultation advised.        New mild intrahepatic and extrahepatic bile duct dilation with   limited assessment of distal CBD due to motion. Consider outpatient   MRCP to exclude underlying obstructive process.        Incompletely characterized left renal lesion which can be assessed on   the above suggested MRCP.        MACRO:   Suri Moses discussed the significance and urgency of this   critical finding by Epic secure chat with  TALYA FRIED on   10/16/2024 at 7:01 pm.  (**-RCF-**) Findings:  See findings.        Signed by: Suri Moses 10/16/2024 7:15 PM   Dictation workstation:   [id number]         Procedure  Procedures  EMERGENCY DEPARTMENT COURSE/MDM:   Medical Decision Making    Vitals:    Vitals:    10/16/24 1529 10/16/24 1845 10/16/24 2030   BP: (!) 187/90 (!) 176/95 137/89   BP Location: Left arm Right arm Right arm   Patient Position: Sitting Lying Lying   Pulse: 74 91 86   Resp: 16 16 (!) 21   Temp: 36 °C (96.8 °F)     TempSrc: Temporal     SpO2: 100% 100% 100%   Weight: 86.2 kg (190 lb)     Height: 1.88 m (6' 2\")       Adrián Meza is a male 69 y.o. who presents to the ER for abdominal pain. On arrival the patients vital signs were: Afebrile, Reguar HR, Hypertensive, Regular RR, and Normoxic on room air. History obtained from: patient.  Given abdominal pain with tender hernia, no prior reduction of hernia plan for basic labs and CT abdomen prior to attempt at reduction to avoid potentially reducing necrotic tissue.  Will provide morphine for analgesia, Zofran for antiemesis.    ED Course as of 10/16/24 2139   Wed Oct 16, 2024   1747 CBC and Auto Differential(!)  Leukocytosis with left shift, will provide empiric Rocephin Flagyl for intra-abdominal coverage.  No anemia thrombocytosis thrombocytopenia [CB]   1747 Comprehensive Metabolic Panel(!)  Normoglycemic, no acute electrolyte or " hepatorenal abnormality [CB]   1748 LIPASE: 18  Not greater than 3 times upper normal limit doubt pancreatitis [CB]   1754 Discussed with Dr. Worley, ACS, who requested to reconvene further after CT results [CB]   1914 Hernia reduced by attending with gentle steady pressure.     Discussed with Dr. Worley, surgery, who agreed to assess the patient [CB]   1948 CT abdomen pelvis w IV contrast  Incarcerated small bowel within left inguinal hernia with upstream mid small bowel obstruction.    New mild intrahepatic and extrahepatic bile duct dilation with limited assessment of distal CBD due to motion.       Incompletely characterized left renal lesion [CB]   2013 Discussed with surgery, Dr. Worley who recommended clear liquid diet, reassessment at 915, with lactate recheck [CB]   2113 Urinalysis with Reflex Culture and Microscopic(!)  No bacteria nitrite or leukocyte esterase to suggest UTI [CB]   2116 Lactate: 1.8  improved [CB]   2138 Surgical NP reassessed the patient, recommended admission for obs [CB]      ED Course User Index  [CB] Oniel Cooper,          Diagnoses as of 10/16/24 2139   Incarcerated left inguinal hernia   Left lower quadrant pain     External Records Reviewed: I reviewed recent and relevant outside records including inpatient notes, outpatient records    Shared decision making for disposition  Patient and/or patient´s representative was counseled regarding labs, imaging, likely diagnosis. All questions were answered. Recommendation was made   for Admission given the need for further escalation of care to inpatient management. Patient agreed and was admitted in stable condition. Admitting team was notified of any pending labs or imaging to ensure continuity of care.     ED Medications administered this visit:    Medications   ondansetron (Zofran) injection 4 mg (4 mg intravenous Given 10/16/24 1840)   morphine injection 4 mg (4 mg intravenous Given 10/16/24 1840)   cefTRIAXone (Rocephin) 2 g in  dextrose (iso) IV 50 mL (0 g intravenous Stopped 10/16/24 1932)   metroNIDAZOLE (Flagyl) 500 mg in sodium chloride (iso)  mL (0 mg intravenous Stopped 10/16/24 2017)   iohexol (OMNIPaque) 350 mg iodine/mL solution 75 mL (75 mL intravenous Given 10/16/24 1809)   sodium chloride 0.9 % bolus 500 mL (0 mL intravenous Stopped 10/16/24 1933)       New Prescriptions from this visit:    New Prescriptions    No medications on file       Follow-up:  Ja Acuna MD  [address] [facility]  [address]  [place] [id number]  [phone]          Andrea Worley MD  [address] [facility]  [address]  [place] [id number] [phone]              Final Impression:   1. Incarcerated left inguinal hernia    2. Left lower quadrant pain          Please excuse any misspellings or unintended errors related to the Dragon speech recognition software used to dictate this note.    I reviewed the case with the attending ED physician. The attending ED physician agrees with the plan.      Oniel Cooper DO  Resident  10/16/24 2139       Oniel Cooper DO  Resident  10/16/24 2139

## 2024-10-16 NOTE — ED TRIAGE NOTES
States that he ate breakfast and has been having stomach pain dizziness and vomiting. Denies passing any gas. States that he does have 2 inguinal hernias and one needs repaired. Last BM was this morning. Is able to keep sips of coke down without any issue at this time.

## 2024-10-16 NOTE — ED TRIAGE NOTES
Secondary to patient volumes and overcrowding, I performed a brief medical screening exam of the patient in triage, as the patient awaits space in the main ED.    History of Present Illness:  Adrián Meza presents with   Chief Complaint   Patient presents with    Abdominal Pain     States that he ate breakfast and has been having stomach pain dizziness and vomiting. Denies passing any gas. States that he does have 2 inguinal hernias and one needs repaired. Last BM was this morning. Is able to keep sips of coke down without any issue at this time.       Physical Exam:  General - In no acute distress  Respiratory - Breathing comfortably  Cardiac - Normal S1, S2, no m/g/r  Neurologic - Moving all extremities  Abdomen -bowel sounds present, no CVAT, Diffuse abdominal pain    Medical Decision Making:  Patient will require further evaluation in the main ED.    Initial diagnostic tests were ordered from triage.      The patient demonstrates understanding that this initial evaluation is a brief medical screening exam and the expectation is that they await for space in the main ED to be further evaluated.  The patient understands that, if they leave prior to further evaluation in the main ED after this initial evaluation in triage, they are doing so under their own accord knowing that their evaluation/work-up is not yet complete. The patient also understands that any preliminary diagnostic results, including abnormalities, may not be shared with them, if they choose to leave prior to further evaluation in the main ED.

## 2024-10-17 ENCOUNTER — APPOINTMENT (OUTPATIENT)
Dept: RADIOLOGY | Facility: HOSPITAL | Age: 69
DRG: 394 | End: 2024-10-17
Payer: MEDICARE

## 2024-10-17 VITALS
TEMPERATURE: 98.1 F | RESPIRATION RATE: 18 BRPM | OXYGEN SATURATION: 97 % | WEIGHT: 190 LBS | SYSTOLIC BLOOD PRESSURE: 123 MMHG | BODY MASS INDEX: 24.38 KG/M2 | HEART RATE: 85 BPM | DIASTOLIC BLOOD PRESSURE: 69 MMHG | HEIGHT: 74 IN

## 2024-10-17 LAB
ANION GAP SERPL CALC-SCNC: 10 MMOL/L (ref 10–20)
APPEARANCE UR: CLEAR
BILIRUB UR STRIP.AUTO-MCNC: NEGATIVE MG/DL
BUN SERPL-MCNC: 14 MG/DL (ref 6–23)
CALCIUM SERPL-MCNC: 8.6 MG/DL (ref 8.6–10.3)
CHLORIDE SERPL-SCNC: 105 MMOL/L (ref 98–107)
CO2 SERPL-SCNC: 26 MMOL/L (ref 21–32)
COLOR UR: YELLOW
CREAT SERPL-MCNC: 0.83 MG/DL (ref 0.5–1.3)
EGFRCR SERPLBLD CKD-EPI 2021: >90 ML/MIN/1.73M*2
ERYTHROCYTE [DISTWIDTH] IN BLOOD BY AUTOMATED COUNT: 14.4 % (ref 11.5–14.5)
GLUCOSE BLD MANUAL STRIP-MCNC: 103 MG/DL (ref 74–99)
GLUCOSE BLD MANUAL STRIP-MCNC: 96 MG/DL (ref 74–99)
GLUCOSE SERPL-MCNC: 104 MG/DL (ref 74–99)
GLUCOSE UR STRIP.AUTO-MCNC: NORMAL MG/DL
HCT VFR BLD AUTO: 42 % (ref 41–52)
HGB BLD-MCNC: 15.1 G/DL (ref 13.5–17.5)
HOLD SPECIMEN: NORMAL
HOLD SPECIMEN: NORMAL
KETONES UR STRIP.AUTO-MCNC: ABNORMAL MG/DL
LEUKOCYTE ESTERASE UR QL STRIP.AUTO: NEGATIVE
MCH RBC QN AUTO: 32.9 PG (ref 26–34)
MCHC RBC AUTO-ENTMCNC: 36 G/DL (ref 32–36)
MCV RBC AUTO: 92 FL (ref 80–100)
NITRITE UR QL STRIP.AUTO: NEGATIVE
NRBC BLD-RTO: 0 /100 WBCS (ref 0–0)
PH UR STRIP.AUTO: 6.5 [PH]
PLATELET # BLD AUTO: 219 X10*3/UL (ref 150–450)
POTASSIUM SERPL-SCNC: 3.1 MMOL/L (ref 3.5–5.3)
PROT UR STRIP.AUTO-MCNC: ABNORMAL MG/DL
RBC # BLD AUTO: 4.59 X10*6/UL (ref 4.5–5.9)
RBC # UR STRIP.AUTO: ABNORMAL /UL
RBC #/AREA URNS AUTO: ABNORMAL /HPF
SODIUM SERPL-SCNC: 138 MMOL/L (ref 136–145)
SP GR UR STRIP.AUTO: 1.04
UROBILINOGEN UR STRIP.AUTO-MCNC: NORMAL MG/DL
WBC # BLD AUTO: 12.9 X10*3/UL (ref 4.4–11.3)
WBC #/AREA URNS AUTO: ABNORMAL /HPF

## 2024-10-17 PROCEDURE — 2500000004 HC RX 250 GENERAL PHARMACY W/ HCPCS (ALT 636 FOR OP/ED): Mod: JZ

## 2024-10-17 PROCEDURE — 74018 RADEX ABDOMEN 1 VIEW: CPT

## 2024-10-17 PROCEDURE — 74018 RADEX ABDOMEN 1 VIEW: CPT | Performed by: RADIOLOGY

## 2024-10-17 PROCEDURE — 85027 COMPLETE CBC AUTOMATED: CPT

## 2024-10-17 PROCEDURE — 36415 COLL VENOUS BLD VENIPUNCTURE: CPT

## 2024-10-17 PROCEDURE — 82374 ASSAY BLOOD CARBON DIOXIDE: CPT

## 2024-10-17 PROCEDURE — 96376 TX/PRO/DX INJ SAME DRUG ADON: CPT

## 2024-10-17 PROCEDURE — G0378 HOSPITAL OBSERVATION PER HR: HCPCS

## 2024-10-17 PROCEDURE — 82947 ASSAY GLUCOSE BLOOD QUANT: CPT

## 2024-10-17 PROCEDURE — 81001 URINALYSIS AUTO W/SCOPE: CPT

## 2024-10-17 PROCEDURE — 99232 SBSQ HOSP IP/OBS MODERATE 35: CPT | Performed by: SURGERY

## 2024-10-17 PROCEDURE — 96366 THER/PROPH/DIAG IV INF ADDON: CPT

## 2024-10-17 ASSESSMENT — PAIN DESCRIPTION - ORIENTATION
ORIENTATION: LEFT

## 2024-10-17 ASSESSMENT — PAIN - FUNCTIONAL ASSESSMENT: PAIN_FUNCTIONAL_ASSESSMENT: 0-10

## 2024-10-17 ASSESSMENT — PAIN SCALES - GENERAL
PAINLEVEL_OUTOF10: 3
PAINLEVEL_OUTOF10: 7
PAINLEVEL_OUTOF10: 9
PAINLEVEL_OUTOF10: 6
PAINLEVEL_OUTOF10: 4

## 2024-10-17 ASSESSMENT — PAIN DESCRIPTION - LOCATION
LOCATION: ABDOMEN

## 2024-10-17 NOTE — H&P
"History Of Present Illness  Adrián Meza \"Pranav" is a 69 y.o. male with a past medical history of DMII, HTN, HLD, nephrolithiasis, left inguinal hernia, previous right inguinal hernia repair, who presented to [facility] ED today with abdominal pain, nausea, and vomiting after breakfast today. States the abdominal pain was in the left inguinal area where he has a hernia. States he was unable to reduce the hernia so he came to the ED. Last BM was this morning and was normal prior to the vomiting episode. States he has not had a BM or passed gas since.  States he threw up at least 30 times today. States he has some dizziness and chills. States pain is still 6/10 after hernia reduction. Denies fever, chills, chest pain, shortness of breath, diarrhea, or constipation. Last EGD/colonoscopy 6/26/24. After a lengthy discussion with patient, patient wants to be a DNR-CC. He states understanding that there will be no life saving measures initiated and verbalized understanding and signed DNR paperwork.    ER Course: Hemodynamically stable, 36.0C, /90, HR 74, RR 16, 100% RA. EKG unavailable for my review. Labs: glucose 128. Calcium 10.4. Lactate 2.3, repeat 1.8. WBC 15.8, neutrophils 14.30. See imaging results below. Ceftriaxone, flagyl, morphine, Zofran, and  IVF given in ED. Surgery consulted in ED (Dr. Worley) who recommended CLD and admission. Hernia was reduced by ED attending. Patient is being admitted to Dr. Acuna who will continue to follow. I was asked to do the H&P and initial assessment.      Past Medical History  As above      Surgical History  as above; left first toe amputation, Total knee arthroplasty (06/26/2013); Knee arthroscopy w/ debridement (06/26/2013); Cervical fusion (06/26/2013); Lumbar fusion (06/26/2013); and Knee arthroscopy w/ debridement (06/26/2013).        Social History  Former alcohol use, former tobacco use, daily marijuana use. Lives with brother in law. Ambulates with cane.     Family " "History  No family history on file.     Allergies  Amoxicillin, Duloxetine, and Penicillin    Review of Systems  10 point review of systems negative except as noted above.    Physical Exam  Constitutional:       Appearance: Normal appearance.   HENT:      Head: Normocephalic and atraumatic.      Nose: Nose normal.      Mouth/Throat:      Mouth: Mucous membranes are moist.      Pharynx: Oropharynx is clear.   Eyes:      Extraocular Movements: Extraocular movements intact.      Conjunctiva/sclera: Conjunctivae normal.      Pupils: Pupils are equal, round, and reactive to light.   Cardiovascular:      Rate and Rhythm: Normal rate and regular rhythm.      Pulses: Normal pulses.      Heart sounds: Normal heart sounds.   Pulmonary:      Effort: Pulmonary effort is normal.      Breath sounds: Normal breath sounds.   Abdominal:      General: Bowel sounds are normal.      Palpations: Abdomen is soft.      Hernia: A hernia is present.      Comments: Left inguinal TTP but reduced.   Musculoskeletal:         General: Normal range of motion.      Cervical back: Normal range of motion and neck supple.   Skin:     General: Skin is warm and dry.      Capillary Refill: Capillary refill takes less than 2 seconds.   Neurological:      General: No focal deficit present.      Mental Status: He is alert and oriented to person, place, and time.   Psychiatric:         Mood and Affect: Mood normal.         Behavior: Behavior normal.         Thought Content: Thought content normal.         Judgment: Judgment normal.          Last Recorded Vitals  Blood pressure 141/90, pulse 85, temperature 37.1 °C (98.8 °F), resp. rate (!) 21, height 1.88 m (6' 2\"), weight 86.2 kg (190 lb), SpO2 97%.    Relevant Results  Results for orders placed or performed during the hospital encounter of 10/16/24 (from the past 24 hours)   Lactate   Result Value Ref Range    Lactate 2.3 (H) 0.4 - 2.0 mmol/L   Lipase   Result Value Ref Range    Lipase 18 9 - 82 U/L "   Comprehensive Metabolic Panel   Result Value Ref Range    Glucose 128 (H) 74 - 99 mg/dL    Sodium 137 136 - 145 mmol/L    Potassium 3.5 3.5 - 5.3 mmol/L    Chloride 98 98 - 107 mmol/L    Bicarbonate 26 21 - 32 mmol/L    Anion Gap 17 10 - 20 mmol/L    Urea Nitrogen 11 6 - 23 mg/dL    Creatinine 0.77 0.50 - 1.30 mg/dL    eGFR >90 >60 mL/min/1.73m*2    Calcium 10.4 (H) 8.6 - 10.3 mg/dL    Albumin 5.0 3.4 - 5.0 g/dL    Alkaline Phosphatase 97 33 - 136 U/L    Total Protein 8.0 6.4 - 8.2 g/dL    AST 21 9 - 39 U/L    Bilirubin, Total 1.1 0.0 - 1.2 mg/dL    ALT 18 10 - 52 U/L   CBC and Auto Differential   Result Value Ref Range    WBC 15.8 (H) 4.4 - 11.3 x10*3/uL    nRBC 0.0 0.0 - 0.0 /100 WBCs    RBC 5.29 4.50 - 5.90 x10*6/uL    Hemoglobin 16.7 13.5 - 17.5 g/dL    Hematocrit 48.9 41.0 - 52.0 %    MCV 92 80 - 100 fL    MCH 31.6 26.0 - 34.0 pg    MCHC 34.2 32.0 - 36.0 g/dL    RDW 13.9 11.5 - 14.5 %    Platelets 238 150 - 450 x10*3/uL    Neutrophils % 90.6 40.0 - 80.0 %    Immature Granulocytes %, Automated 0.6 0.0 - 0.9 %    Lymphocytes % 3.8 13.0 - 44.0 %    Monocytes % 4.8 2.0 - 10.0 %    Eosinophils % 0.0 0.0 - 6.0 %    Basophils % 0.2 0.0 - 2.0 %    Neutrophils Absolute 14.30 (H) 1.20 - 7.70 x10*3/uL    Immature Granulocytes Absolute, Automated 0.10 0.00 - 0.70 x10*3/uL    Lymphocytes Absolute 0.60 (L) 1.20 - 4.80 x10*3/uL    Monocytes Absolute 0.76 0.10 - 1.00 x10*3/uL    Eosinophils Absolute 0.00 0.00 - 0.70 x10*3/uL    Basophils Absolute 0.03 0.00 - 0.10 x10*3/uL   Lactate   Result Value Ref Range    Lactate 1.8 0.4 - 2.0 mmol/L   Urinalysis with Reflex Culture and Microscopic   Result Value Ref Range    Color, Urine Colorless (N) Light-Yellow, Yellow, Dark-Yellow    Appearance, Urine Clear Clear    Specific Gravity, Urine 1.037 (N) 1.005 - 1.035    pH, Urine 8.5 (N) 5.0, 5.5, 6.0, 6.5, 7.0, 7.5, 8.0    Protein, Urine 50 (1+) (A) NEGATIVE, 10 (TRACE), 20 (TRACE) mg/dL    Glucose, Urine Normal Normal mg/dL    Blood,  Urine 0.03 (TRACE) (A) NEGATIVE    Ketones, Urine 40 (2+) (A) NEGATIVE mg/dL    Bilirubin, Urine NEGATIVE NEGATIVE    Urobilinogen, Urine Normal Normal mg/dL    Nitrite, Urine NEGATIVE NEGATIVE    Leukocyte Esterase, Urine NEGATIVE NEGATIVE   Urinalysis Microscopic   Result Value Ref Range    WBC, Urine 1-5 1-5, NONE /HPF    RBC, Urine 6-10 (A) NONE, 1-2, 3-5 /HPF     CT abdomen pelvis w IV contrast    Result Date: 10/16/2024  Interpreted By:  Suri Moses, STUDY: CT ABDOMEN PELVIS W IV CONTRAST;  10/16/2024 6:09 pm   INDICATION: Signs/Symptoms:Diffuse abdominal pain with constipation, history of abdominal surgeries.   COMPARISON: CT abdomen pelvis 10/08/2017   ACCESSION NUMBER(S): [id number]   ORDERING CLINICIAN: TALYA FRIED   TECHNIQUE: Axial CT of the abdomen and pelvis was performed following intravenous administration of 75 ml of contrast Omnipaque 350 with coronal and sagittal reconstruction.   FINDINGS: Motion limited examination.   Lower chest: No focal consolidation or pleural effusion.   Liver: No mass.   Biliary: Mild intrahepatic and CBD dilation up to 12 mm (series 301, image 30), new compared to 10/18/2017 CT. The distal CBD is not well assessed due to motion. Cholelithiasis without acute inflammation.   Spleen: No mass. No splenomegaly.   Pancreas: No mass or duct dilation.   Adrenals: Normal.   Kidneys: New multifocal left renal parenchymal scarring. Nonobstructing calculus in the left lower pole measuring 10 mm. There is an adjacent 2.1 cm slightly above water attenuation lesion in the left lower pole, incompletely characterized. No hydronephrosis in the right or left kidney.   GI tract: There is a loop of mid small bowel incarcerated within the left inguinal hernia with adjacent inflammation and free fluid. There is associated upstream mid small bowel dilation with multiple air-fluid levels as well as fecalization. No definite pneumatosis identified. The distal small bowel and the colon are  normal in caliber. Colonic diverticulosis without acute inflammation. Normal appendix.   Lymph nodes: No lymphadenopathy.   Mesentery/peritoneum: No free fluid, free air or fluid collection.   Vasculature: Mild abdominal aortic atherosclerotic calcifications without aneurysmal dilation. No portal venous gas.   Pelvis: No free fluid, free air or fluid collection. Underdistended bladder. The prostate is heterogeneous and enlarged measuring 5.6 cm in the transverse dimension impressing upon the bladder base; correlate with serum PSA.   Bones/Soft tissues: Prior right inguinal hernia repair without recurrence. Left inguinal hernia described above.Progression of multilevel degenerative disc disease. Grossly unchanged L4-L5 posterior instrumented fusion and L3-L4 and L4-L5 disc spacer placement. Bilateral hip osteoarthritis. Unchanged appearance bilateral posterior iliac bone, presumably postsurgical.       Incarcerated small bowel within left inguinal hernia with upstream mid small bowel obstruction. Surgical consultation advised.   New mild intrahepatic and extrahepatic bile duct dilation with limited assessment of distal CBD due to motion. Consider outpatient MRCP to exclude underlying obstructive process.   Incompletely characterized left renal lesion which can be assessed on the above suggested MRCP.   MACRO: Suri Moses discussed the significance and urgency of this critical finding by Epic secure chat with  TALYA FRIED on 10/16/2024 at 7:01 pm.  (**-RCF-**) Findings:  See findings.   Signed by: Suri Moses 10/16/2024 7:15 PM Dictation workstation:   [id number]        Assessment/Plan   Assessment & Plan  Small bowel obstruction (Multi)    69 year old male with a past medical history of DMII, HTN, HLD, nephrolithiasis, left inguinal hernia, previous right inguinal hernia repair, who presented to [facility] ED today with abdominal pain, nausea, and vomiting after breakfast today. States the abdominal pain was in  the left inguinal area where he has a hernia. States he was unable to reduce the hernia so he came to the ED. Surgery consulted. Continue antibiotics. Patient to be hospitalized for further evaluation and medical management.     Small bowel obstruction 2/2 incarcerated left inguinal hernia  Lactic acidosis, resolved  Leukocytosis   Abdominal pain   Nausea/vomiting  Admit to OBS/RMF to Dr. Acuna  Surgery and consult and appreciate recs   See imaging results above   Continue ceftriaxone and flagyl  Pain meds and antiemtics PRN  CLD  Monitor I&Os   KUB in AM  Repeat labs in AM     Mild biliary duct dilatation  Left renal lesion  Enlarged prostate  MRCP recommended. Defer to attending for inpatient vs outpatient management     Chronic conditions   #DMII, HTN, HLD, nephrolithiasis  Continue home meds as appropriate when nursing completes home med rec   SSI with hypoglycemic protocol  Hold oral diabetic meds  DNR-CC     #DVT prophylaxis   Heparin SQ. Hold AM dose 10/17/24 for possible surgery   SCD's as tolerated     I spent 40 minutes in the professional and overall care of this patient.    Ashley Herrera, APRN-CNP

## 2024-10-17 NOTE — PROGRESS NOTES
"Adrián Meza \"Pranav" is a 69 y.o. male on day 1 of admission presenting with Small bowel obstruction (Multi).    Subjective   Patient is resting comfortably in bed. He denies any pain. No N/V but has not yet passed gas or had a BM. Morning KUB showed mild dilation of colon and moderate colonic fecal residue.       Objective     Physical Exam  Vitals reviewed.   Constitutional:       General: He is not in acute distress.  Cardiovascular:      Rate and Rhythm: Normal rate and regular rhythm.   Pulmonary:      Effort: Pulmonary effort is normal. No respiratory distress.      Breath sounds: Normal breath sounds.   Abdominal:      General: Bowel sounds are normal.      Palpations: Abdomen is soft.      Tenderness: There is no abdominal tenderness.      Comments: Hernia still reduced, no pain at site.    Skin:     General: Skin is warm and dry.   Neurological:      General: No focal deficit present.      Mental Status: He is alert.   Psychiatric:         Mood and Affect: Mood normal.         Last Recorded Vitals  Blood pressure 123/69, pulse 85, temperature 36.7 °C (98.1 °F), temperature source Temporal, resp. rate 18, height 1.88 m (6' 2\"), weight 86.2 kg (190 lb), SpO2 97%.  Intake/Output last 3 Shifts:  I/O last 3 completed shifts:  In: 750 (8.7 mL/kg) [IV Piggyback:750]  Out: - (0 mL/kg)   Weight: 86.2 kg     Relevant Results  Results for orders placed or performed during the hospital encounter of 10/16/24 (from the past 24 hours)   Lactate   Result Value Ref Range    Lactate 2.3 (H) 0.4 - 2.0 mmol/L   Lipase   Result Value Ref Range    Lipase 18 9 - 82 U/L   Comprehensive Metabolic Panel   Result Value Ref Range    Glucose 128 (H) 74 - 99 mg/dL    Sodium 137 136 - 145 mmol/L    Potassium 3.5 3.5 - 5.3 mmol/L    Chloride 98 98 - 107 mmol/L    Bicarbonate 26 21 - 32 mmol/L    Anion Gap 17 10 - 20 mmol/L    Urea Nitrogen 11 6 - 23 mg/dL    Creatinine 0.77 0.50 - 1.30 mg/dL    eGFR >90 >60 mL/min/1.73m*2    Calcium 10.4 " (H) 8.6 - 10.3 mg/dL    Albumin 5.0 3.4 - 5.0 g/dL    Alkaline Phosphatase 97 33 - 136 U/L    Total Protein 8.0 6.4 - 8.2 g/dL    AST 21 9 - 39 U/L    Bilirubin, Total 1.1 0.0 - 1.2 mg/dL    ALT 18 10 - 52 U/L   CBC and Auto Differential   Result Value Ref Range    WBC 15.8 (H) 4.4 - 11.3 x10*3/uL    nRBC 0.0 0.0 - 0.0 /100 WBCs    RBC 5.29 4.50 - 5.90 x10*6/uL    Hemoglobin 16.7 13.5 - 17.5 g/dL    Hematocrit 48.9 41.0 - 52.0 %    MCV 92 80 - 100 fL    MCH 31.6 26.0 - 34.0 pg    MCHC 34.2 32.0 - 36.0 g/dL    RDW 13.9 11.5 - 14.5 %    Platelets 238 150 - 450 x10*3/uL    Neutrophils % 90.6 40.0 - 80.0 %    Immature Granulocytes %, Automated 0.6 0.0 - 0.9 %    Lymphocytes % 3.8 13.0 - 44.0 %    Monocytes % 4.8 2.0 - 10.0 %    Eosinophils % 0.0 0.0 - 6.0 %    Basophils % 0.2 0.0 - 2.0 %    Neutrophils Absolute 14.30 (H) 1.20 - 7.70 x10*3/uL    Immature Granulocytes Absolute, Automated 0.10 0.00 - 0.70 x10*3/uL    Lymphocytes Absolute 0.60 (L) 1.20 - 4.80 x10*3/uL    Monocytes Absolute 0.76 0.10 - 1.00 x10*3/uL    Eosinophils Absolute 0.00 0.00 - 0.70 x10*3/uL    Basophils Absolute 0.03 0.00 - 0.10 x10*3/uL   Lactate   Result Value Ref Range    Lactate 1.8 0.4 - 2.0 mmol/L   Urinalysis with Reflex Culture and Microscopic   Result Value Ref Range    Color, Urine Colorless (N) Light-Yellow, Yellow, Dark-Yellow    Appearance, Urine Clear Clear    Specific Gravity, Urine 1.037 (N) 1.005 - 1.035    pH, Urine 8.5 (N) 5.0, 5.5, 6.0, 6.5, 7.0, 7.5, 8.0    Protein, Urine 50 (1+) (A) NEGATIVE, 10 (TRACE), 20 (TRACE) mg/dL    Glucose, Urine Normal Normal mg/dL    Blood, Urine 0.03 (TRACE) (A) NEGATIVE    Ketones, Urine 40 (2+) (A) NEGATIVE mg/dL    Bilirubin, Urine NEGATIVE NEGATIVE    Urobilinogen, Urine Normal Normal mg/dL    Nitrite, Urine NEGATIVE NEGATIVE    Leukocyte Esterase, Urine NEGATIVE NEGATIVE   Extra Urine Gray Tube   Result Value Ref Range    Extra Tube Hold for add-ons.    Urinalysis Microscopic   Result Value Ref  Range    WBC, Urine 1-5 1-5, NONE /HPF    RBC, Urine 6-10 (A) NONE, 1-2, 3-5 /HPF   Urinalysis with Reflex Culture and Microscopic   Result Value Ref Range    Color, Urine Yellow Light-Yellow, Yellow, Dark-Yellow    Appearance, Urine Clear Clear    Specific Gravity, Urine 1.037 (N) 1.005 - 1.035    pH, Urine 6.5 5.0, 5.5, 6.0, 6.5, 7.0, 7.5, 8.0    Protein, Urine 50 (1+) (A) NEGATIVE, 10 (TRACE), 20 (TRACE) mg/dL    Glucose, Urine Normal Normal mg/dL    Blood, Urine 0.03 (TRACE) (A) NEGATIVE    Ketones, Urine 10 (1+) (A) NEGATIVE mg/dL    Bilirubin, Urine NEGATIVE NEGATIVE    Urobilinogen, Urine Normal Normal mg/dL    Nitrite, Urine NEGATIVE NEGATIVE    Leukocyte Esterase, Urine NEGATIVE NEGATIVE   Urinalysis Microscopic   Result Value Ref Range    WBC, Urine 1-5 1-5, NONE /HPF    RBC, Urine 6-10 (A) NONE, 1-2, 3-5 /HPF   CBC   Result Value Ref Range    WBC 12.9 (H) 4.4 - 11.3 x10*3/uL    nRBC 0.0 0.0 - 0.0 /100 WBCs    RBC 4.59 4.50 - 5.90 x10*6/uL    Hemoglobin 15.1 13.5 - 17.5 g/dL    Hematocrit 42.0 41.0 - 52.0 %    MCV 92 80 - 100 fL    MCH 32.9 26.0 - 34.0 pg    MCHC 36.0 32.0 - 36.0 g/dL    RDW 14.4 11.5 - 14.5 %    Platelets 219 150 - 450 x10*3/uL   Basic metabolic panel   Result Value Ref Range    Glucose 104 (H) 74 - 99 mg/dL    Sodium 138 136 - 145 mmol/L    Potassium 3.1 (L) 3.5 - 5.3 mmol/L    Chloride 105 98 - 107 mmol/L    Bicarbonate 26 21 - 32 mmol/L    Anion Gap 10 10 - 20 mmol/L    Urea Nitrogen 14 6 - 23 mg/dL    Creatinine 0.83 0.50 - 1.30 mg/dL    eGFR >90 >60 mL/min/1.73m*2    Calcium 8.6 8.6 - 10.3 mg/dL   POCT GLUCOSE   Result Value Ref Range    POCT Glucose 103 (H) 74 - 99 mg/dL   POCT GLUCOSE   Result Value Ref Range    POCT Glucose 96 74 - 99 mg/dL     XR abdomen 1 view    Result Date: 10/17/2024  Interpreted By:  Hugo Watson, STUDY: XR ABDOMEN 1 VIEW; 10/17/2024 7:15 am   INDICATION: Signs/Symptoms:reduced incarcerated hernia/SBO.     COMPARISON: CT abdomen/pelvis of 10/16/2024.    ACCESSION NUMBER(S): [id number]   ORDERING CLINICIAN: VINNY RICHTER   FINDINGS: Single frontal view of the abdomen was obtained.   There is mild diffuse dilation of the colon and moderate colonic fecal residue. Overall bowel gas pattern is nonspecific. There is residual excreted contrast in the urinary bladder. Surgical clips overlie the central lower abdomen and right inguinal region. Osteopenia is present with moderate levoscoliosis centered at L2-3. Multilevel disc space narrowing endplate spurring/sclerosis present in the spine. Postoperative changes of the lower lumbar spine with fusion hardware again seen along with laminectomy defects.       1.  Mild diffuse dilation of the colon with moderate colonic fecal residue. Overall nonspecific bowel gas pattern.   Signed by: Hugo Watson 10/17/2024 7:49 AM Dictation workstation:   [id number]    CT abdomen pelvis w IV contrast    Result Date: 10/16/2024  Interpreted By:  Suri Moses, STUDY: CT ABDOMEN PELVIS W IV CONTRAST;  10/16/2024 6:09 pm   INDICATION: Signs/Symptoms:Diffuse abdominal pain with constipation, history of abdominal surgeries.   COMPARISON: CT abdomen pelvis 10/08/2017   ACCESSION NUMBER(S): [id number]   ORDERING CLINICIAN: TALYA FRIED   TECHNIQUE: Axial CT of the abdomen and pelvis was performed following intravenous administration of 75 ml of contrast Omnipaque 350 with coronal and sagittal reconstruction.   FINDINGS: Motion limited examination.   Lower chest: No focal consolidation or pleural effusion.   Liver: No mass.   Biliary: Mild intrahepatic and CBD dilation up to 12 mm (series 301, image 30), new compared to 10/18/2017 CT. The distal CBD is not well assessed due to motion. Cholelithiasis without acute inflammation.   Spleen: No mass. No splenomegaly.   Pancreas: No mass or duct dilation.   Adrenals: Normal.   Kidneys: New multifocal left renal parenchymal scarring. Nonobstructing calculus in the left lower pole measuring  10 mm. There is an adjacent 2.1 cm slightly above water attenuation lesion in the left lower pole, incompletely characterized. No hydronephrosis in the right or left kidney.   GI tract: There is a loop of mid small bowel incarcerated within the left inguinal hernia with adjacent inflammation and free fluid. There is associated upstream mid small bowel dilation with multiple air-fluid levels as well as fecalization. No definite pneumatosis identified. The distal small bowel and the colon are normal in caliber. Colonic diverticulosis without acute inflammation. Normal appendix.   Lymph nodes: No lymphadenopathy.   Mesentery/peritoneum: No free fluid, free air or fluid collection.   Vasculature: Mild abdominal aortic atherosclerotic calcifications without aneurysmal dilation. No portal venous gas.   Pelvis: No free fluid, free air or fluid collection. Underdistended bladder. The prostate is heterogeneous and enlarged measuring 5.6 cm in the transverse dimension impressing upon the bladder base; correlate with serum PSA.   Bones/Soft tissues: Prior right inguinal hernia repair without recurrence. Left inguinal hernia described above.Progression of multilevel degenerative disc disease. Grossly unchanged L4-L5 posterior instrumented fusion and L3-L4 and L4-L5 disc spacer placement. Bilateral hip osteoarthritis. Unchanged appearance bilateral posterior iliac bone, presumably postsurgical.       Incarcerated small bowel within left inguinal hernia with upstream mid small bowel obstruction. Surgical consultation advised.   New mild intrahepatic and extrahepatic bile duct dilation with limited assessment of distal CBD due to motion. Consider outpatient MRCP to exclude underlying obstructive process.   Incompletely characterized left renal lesion which can be assessed on the above suggested MRCP.   MACRO: Suri Moses discussed the significance and urgency of this critical finding by Epic secure chat with  TALYA FRIED on  10/16/2024 at 7:01 pm.  (**-RCF-**) Findings:  See findings.   Signed by: Suri Moses 10/16/2024 7:15 PM Dictation workstation:   [id number]         Assessment/Plan   Sreekanth Meza is a 69 y.o. male with a PMH of DM II, HTN, IBS, and chronic neck and back pain who presented to the ED with complaints of left inguinal hernia that he could not reduce and was causing significant pain. General surgery was consulted for incarcerated left inguinal hernia.     Impression  #Incarcerated left inguinal hernia/SBO    > Hernia completely reduced in the ED and remains reduced    > Resolved pain and nausea    > Lactate normalized     Recommendations  -No indication for acute surgical intervention  -Regular diet  -Barring any changes as an inpatient, patient should follow-up with Dr. Worley in 1 to 2 weeks for evaluation and scheduling of an elective left inguinal hernia repair.  -Remainder of care per primary team  -Consider outpatient ultrasound or MRCP for further evaluation of biliary ductal dilatation     Dispo: Okay for discharge from a general surgery standpoint.     Patient seen with attending on-call surgeon Dr. Brunilda Altamirano PA-C

## 2024-10-17 NOTE — PROGRESS NOTES
"General surgery attending note:  I examined and evaluated the patient.  I discussed the patient with the YONIS.    Subjective   The patient has no abdominal pain.  He is tolerating a clear liquid diet without nausea or vomiting.  He has not had a bowel movement or passed gas.    Objective     Physical Exam  Well-developed, well-nourished, no acute distress, alert and oriented  Abdomen: Nondistended, soft, nontender.  The hernia remains completely reduced.    Last Recorded Vitals  Blood pressure 135/74, pulse 99, temperature 37 °C (98.6 °F), temperature source Temporal, resp. rate 20, height 1.88 m (6' 2\"), weight 86.2 kg (190 lb), SpO2 95%.  Intake/Output last 3 Shifts:  I/O last 3 completed shifts:  In: 750 (8.7 mL/kg) [IV Piggyback:750]  Out: - (0 mL/kg)   Weight: 86.2 kg     Relevant Results  Labs: WBC 12.9, hemoglobin 15.1  Repeat lactate last night was normal.    I reviewed the KUB image from this morning.  The report is not yet available.  Nonspecific bowel gas pattern with gas and stool within the colon and rectum.    Assessment/Plan   Assessment & Plan  Small bowel obstruction (Multi)    Left inguinal hernia remains reduced.  Abdominal exam benign.  KUB shows no obstruction.  Patient may have a regular diet.  He may be discharged home from a surgery standpoint.  I told him to follow-up in my office in 1 to 2 weeks to discuss elective hernia repair.    Radiologist concerned about mild biliary ductal dilatation and recommends MRCP.  Liver function tests are normal.  Consider either outpatient ultrasound or MRCP for further evaluation.  Patient should follow-up with his primary care physician for further evaluation of the left renal lesion and enlarged prostate.  I again discussed these findings and recommendations with the patient.    Andrea Worley MD      "

## 2024-10-17 NOTE — CONSULTS
Reason For Consult  Incarcerated hernia    History Of Present Illness  Sreekanth Meza is a 69 y.o. male with a 4-year history of a left inguinal hernia which he previously was able to reduce, who at 830 this morning developed left inguinal pain followed by nausea/vomiting.  He was unable to reduce the hernia and came into the emergency room this afternoon for evaluation.  He had a bowel movement and flatus this morning prior to onset of his symptoms.  He had previous repair of a right inguinal hernia but no prior repair of the left side.  He had a CT abdomen/pelvis performed which showed a left inguinal hernia containing small bowel resulting in a small bowel obstruction.  Dr. Joyner from the ER reduced the hernia.  The patient reports that his pain is improved, but not completely gone.  No nausea.  His family reports that he looks much more comfortable than earlier today at which time he was doubling over in severe pain.    Past medical history:  Diabetes mellitus type 2  Hypertension  Left great toe amputation  Chronic neck and back pain  EGD/colonoscopy on June 26, 2024    Allergies:  Penicillin caused his tongue to swell a little     Past Medical History  He has a past medical history of Cellulitis of left lower limb and Unilateral inguinal hernia, without obstruction or gangrene, not specified as recurrent.    Surgical History  He has a past surgical history that includes Total knee arthroplasty (06/26/2013); Knee arthroscopy w/ debridement (06/26/2013); Cervical fusion (06/26/2013); Lumbar fusion (06/26/2013); and Knee arthroscopy w/ debridement (06/26/2013).     Social History  He has no history on file for tobacco use, alcohol use, and drug use.    Family History  No family history on file.     Allergies  Amoxicillin, Duloxetine, and Penicillin     Physical Exam  Constitutional: Well-developed, well-nourished, alert and oriented, no acute distress  Skin: Warm and dry, no lesions, no rashes, no jaundice  HEENT:  "Normocephalic, atraumatic, EOMI, no scleral icterus, eyes have no redness or swelling or discharge, external inspection of ears and nose is normal, mucous membranes moist  Neck: Soft, nontender, no mass or adenopathy  Cardiac: Regular rate and rhythm, no murmur  Chest: Patent airway, clear to auscultation, normal breath sounds with good chest expansion, no wheezes or rales or rhonchi noted, thorax symmetric  Abdomen: Nondistended, positive bowel sounds, soft, nontender, no mass.  Tender left inguinal hernia with 3 cm diameter area of persistent or recurrent incarceration which I reduced.  Following reduction his pain and tenderness were improved.  Rectal: Not performed  Extremities: No injury, no lower extremity edema or calf tenderness  Lymphatic: No cervical adenopathy  Neurological: Alert and oriented x3, no obvious focal neurologic abnormalities  Psychological: Appropriate mood and behavior     Last Recorded Vitals  Blood pressure (!) 176/95, pulse 91, temperature 36 °C (96.8 °F), temperature source Temporal, resp. rate 16, height 1.88 m (6' 2\"), weight 86.2 kg (190 lb), SpO2 100%.    Relevant Results  Labs: WBC 15.8, hemoglobin 16.7, platelet 238.  Lactate 2.3.  Liver function tests normal  Glucose 128, other liver function BMP otherwise normal.    I reviewed the CT abdomen/pelvis report and images.  IMPRESSION:  Incarcerated small bowel within left inguinal hernia with upstream  mid small bowel obstruction. Surgical consultation advised.    New mild intrahepatic and extrahepatic bile duct dilation with  limited assessment of distal CBD due to motion. Consider outpatient  MRCP to exclude underlying obstructive process.    Incompletely characterized left renal lesion which can be assessed on  the above suggested MRCP.     Assessment/Plan     69-year-old male with a left inguinal hernia which was incarcerated causing a small bowel obstruction, but now is reduced.  Patient's pain and nausea have improved.  Initial " lactic acid level mildly elevated, repeat is pending.  The patient may have a clear liquid diet.  If he tolerates clear liquid liquids and has no further nausea or abdominal pain and his lactic acid level returns to normal, he may be discharged home from a surgery standpoint.  If he has persistent pain or nausea, then I will plan to admit the patient.  Recommend elective left inguinal herniorrhaphy.  The patient should follow-up in my office in 1 to 2 weeks for evaluation and scheduling of the hernia repair.  Radiologist concerned about mild biliary ductal dilatation and recommends MRCP.  Liver function tests are normal.  Consider either outpatient ultrasound or MRCP for further evaluation.  Patient should follow-up with his primary care physician for further evaluation of the left renal lesion and enlarged prostate.    Andrea Worley MD

## 2024-10-17 NOTE — CONSULTS
Dietitian consult ordered for MST=4, however code status changed to comfort measures only.  Aggressive nutrition interventions not currently indicated. This service remains available as needed. Please reconsult if GOC change.

## 2024-10-17 NOTE — CARE PLAN
The patient's goals for the shift include rest.    The clinical goals for the shift include safety, comfort, and pain control.    Pain - Adult  Add All  Verbalizes/displays adequate comfort level or baseline comfort level  Add  Today at 0020 - Progressing by Rigo Juarez RN  Add  Safety - Adult  Add All  Free from fall injury  Add  Today at 0020 - Progressing by Rigo Juarez RN  Add  Discharge Planning  Add All  Discharge to home or other facility with appropriate resources  Add  Today at 0020 - Progressing by Rigo Juarez RN  Add  Chronic Conditions and Co-morbidities  Add All  Patient's chronic conditions and co-morbidity symptoms are monitored and maintained or improved  Add  Today at 0020 - Progressing by Rigo Juarez RN  Add  Skin  Add All  Prevent/manage excess moisture  Add  Today at 0020 - Progressing by Rigo Juarez RN  Add  Flowsheets  Taken today at 0020  Prevent/manage excess moisture  Monitor for/manage infection if present  Prevent/minimize sheer/friction injuries  Add  Today at 0020 - Progressing by Rigo Juarez RN  Add  Flowsheets  Taken today at 0020  Prevent/minimize sheer/friction injuries  Increase activity/out of bed for meals  Promote/optimize nutrition  Add  Today at 0020 - Progressing by Rigo Juarez RN  Add  Flowsheets  Taken today at 0020  Promote/optimize nutrition  Monitor/record intake including meals

## 2024-10-17 NOTE — CARE PLAN
Problem: Pain - Adult  Goal: Verbalizes/displays adequate comfort level or baseline comfort level  Outcome: Progressing     Problem: Safety - Adult  Goal: Free from fall injury  Outcome: Progressing     Problem: Discharge Planning  Goal: Discharge to home or other facility with appropriate resources  Outcome: Progressing     Problem: Chronic Conditions and Co-morbidities  Goal: Patient's chronic conditions and co-morbidity symptoms are monitored and maintained or improved  Outcome: Progressing     Problem: Skin  Goal: Prevent/manage excess moisture  Outcome: Progressing  Goal: Prevent/minimize sheer/friction injuries  Outcome: Progressing  Goal: Promote/optimize nutrition  Outcome: Progressing

## 2024-10-17 NOTE — PROGRESS NOTES
10/17/24 1053   Discharge Planning   Living Arrangements Family members   Support Systems Family members   Assistance Needed independent   Type of Residence Private residence   Number of Stairs to Enter Residence 2   Number of Stairs Within Residence 10   Do you have animals or pets at home? No   Who is requesting discharge planning? Provider   Home or Post Acute Services None   Expected Discharge Disposition Home   Does the patient need discharge transport arranged? No   Patient Choice   Patient / Family choosing to utilize agency / facility established prior to hospitalization No     Care transitions assessment completed via bedside with patient. TCC introduced self and explained role. Demographics verified. Patient from home with brother in law.  Independent PTA. Uses a cane. Denies SW needs at this time. Patient anticipates no skilled needs at discharge. Dispo pending hospital course. Patient hoping for discharge today after attending rounds. Patient's family to transport home at time of discharge. TCC to continue to follow for discharge planning needs.      PCP: Dr. Ja Acuna  Last seen: this admit   Pharmacy: [facility] [place]   Insurance: [employer]  Dispo: Home     SUKUMAR VINCENT RN TCC

## 2024-10-17 NOTE — CONSULTS
Reason For Consult  Incarcerated left inguinal hernia    History Of Present Illness  Sreekanth Meza is a 69 y.o. male with a PMH of DM II, HTN, IBS, and chronic neck and back pain who presented to the ED with complaints of left inguinal hernia that he could not reduce and was causing significant pain. The patient and his family reported that he had been doubled over with pain, nausea, and vomiting. The patient has apparently had this hernia for roughly 4 years and has always been able to reduce it, until today. The patient is passing gas and reports that he had a BM this morning. He states that he had been having some issues with his bowels and increased his intake of metamucil, so he was afraid that contributed to this issue. The patient has had a previous right inguinal hernia repair as well as a left great toe amputation.     CT A/P done in ED shows a left inguinal hernia with a loop of mid small bowel incarcerated within the left inguinal hernia with adjacent inflammation and free fluid. Patient's WBC elevated at 15.8 and lactate was 2.3 on arrival, with the repeat 3 hours later being WNL at 1.8. Dr Joyner in the ED reduced the hernia initially, and then Dr Worley had to reduce it again, which was done completely. Informed the patient and family that I would come back around 2-554p to see how he was doing and whether or not he was appropriate to go home and follow up outpatient to arrange an elective hernia repair, or if he needed admitted.     Upon initial meeting with the patient and family around 8pm, the patient was A&Ox3 and answered our questions appropriately. When reassessed shortly after 9pm, the patient was found to be diaphoretic, disoriented, and had confused conversation. The patient denied any pain several times, including when I reassessed the left inguinal area for re herniation. The patient said he was drinking fine and that he didn't have any nausea either. The patient's family was no longer  "present, so I could not ask them what had changed in the hour since I had seen him or how long he had been in this condition. The nurse in the ED was not aware of any changes. I informed Dr Joyner of the patient's change in condition and that we had planned to give the okay to send him home initially from our standpoint since his hernia symptoms were improved,  but given his current condition, recommend that he be admitted to medicine for observation with us on consult.       Surgical History  He has a past surgical history that includes Total knee arthroplasty (06/26/2013); Knee arthroscopy w/ debridement (06/26/2013); Cervical fusion (06/26/2013); Lumbar fusion (06/26/2013); and Knee arthroscopy w/ debridement (06/26/2013).     Social History  He has no history on file for tobacco use, alcohol use, and drug use.    Family History  No family history on file.     Allergies  Amoxicillin, Duloxetine, and Penicillin    Review of Systems  A 10 point review of system is negative except for what is mentioned in the HPI       Physical Exam  Physical Exam:    GENERAL: Well developed, no distress, cooperative  HEENT: AT/NC, PERRLA, Sclera is non-icteric  NECK: Trachea midline  CARDIOVASCULAR: RRR, normal S1 and S 2, no murmurs, 2+ equal pulses of the extremities  RESPIRATORY: Patent airways, normal breath sounds with good chest expansion  ABDOMEN: Soft, slight tenderness to left inguinal area without presence of bowel through the hernia wall opening,  normal bowel sounds, no distention  SKIN: Warm and dry, no lesions, no rashes  EXTREMITIES: No cyanosis, edema, or wounds  NEURO: A&Ox2, disoriented, some confused conversation, normal motor and sensation, no focal deficits   PSYCH: Behavior is not at baseline       Last Recorded Vitals  Blood pressure 137/89, pulse 86, temperature 36 °C (96.8 °F), temperature source Temporal, resp. rate (!) 21, height 1.88 m (6' 2\"), weight 86.2 kg (190 lb), SpO2 100%.    Relevant " Results  Results for orders placed or performed during the hospital encounter of 10/16/24 (from the past 24 hours)   Lactate   Result Value Ref Range    Lactate 2.3 (H) 0.4 - 2.0 mmol/L   Lipase   Result Value Ref Range    Lipase 18 9 - 82 U/L   Comprehensive Metabolic Panel   Result Value Ref Range    Glucose 128 (H) 74 - 99 mg/dL    Sodium 137 136 - 145 mmol/L    Potassium 3.5 3.5 - 5.3 mmol/L    Chloride 98 98 - 107 mmol/L    Bicarbonate 26 21 - 32 mmol/L    Anion Gap 17 10 - 20 mmol/L    Urea Nitrogen 11 6 - 23 mg/dL    Creatinine 0.77 0.50 - 1.30 mg/dL    eGFR >90 >60 mL/min/1.73m*2    Calcium 10.4 (H) 8.6 - 10.3 mg/dL    Albumin 5.0 3.4 - 5.0 g/dL    Alkaline Phosphatase 97 33 - 136 U/L    Total Protein 8.0 6.4 - 8.2 g/dL    AST 21 9 - 39 U/L    Bilirubin, Total 1.1 0.0 - 1.2 mg/dL    ALT 18 10 - 52 U/L   CBC and Auto Differential   Result Value Ref Range    WBC 15.8 (H) 4.4 - 11.3 x10*3/uL    nRBC 0.0 0.0 - 0.0 /100 WBCs    RBC 5.29 4.50 - 5.90 x10*6/uL    Hemoglobin 16.7 13.5 - 17.5 g/dL    Hematocrit 48.9 41.0 - 52.0 %    MCV 92 80 - 100 fL    MCH 31.6 26.0 - 34.0 pg    MCHC 34.2 32.0 - 36.0 g/dL    RDW 13.9 11.5 - 14.5 %    Platelets 238 150 - 450 x10*3/uL    Neutrophils % 90.6 40.0 - 80.0 %    Immature Granulocytes %, Automated 0.6 0.0 - 0.9 %    Lymphocytes % 3.8 13.0 - 44.0 %    Monocytes % 4.8 2.0 - 10.0 %    Eosinophils % 0.0 0.0 - 6.0 %    Basophils % 0.2 0.0 - 2.0 %    Neutrophils Absolute 14.30 (H) 1.20 - 7.70 x10*3/uL    Immature Granulocytes Absolute, Automated 0.10 0.00 - 0.70 x10*3/uL    Lymphocytes Absolute 0.60 (L) 1.20 - 4.80 x10*3/uL    Monocytes Absolute 0.76 0.10 - 1.00 x10*3/uL    Eosinophils Absolute 0.00 0.00 - 0.70 x10*3/uL    Basophils Absolute 0.03 0.00 - 0.10 x10*3/uL   Lactate   Result Value Ref Range    Lactate 1.8 0.4 - 2.0 mmol/L   Urinalysis with Reflex Culture and Microscopic   Result Value Ref Range    Color, Urine Colorless (N) Light-Yellow, Yellow, Dark-Yellow     Appearance, Urine Clear Clear    Specific Gravity, Urine 1.037 (N) 1.005 - 1.035    pH, Urine 8.5 (N) 5.0, 5.5, 6.0, 6.5, 7.0, 7.5, 8.0    Protein, Urine 50 (1+) (A) NEGATIVE, 10 (TRACE), 20 (TRACE) mg/dL    Glucose, Urine Normal Normal mg/dL    Blood, Urine 0.03 (TRACE) (A) NEGATIVE    Ketones, Urine 40 (2+) (A) NEGATIVE mg/dL    Bilirubin, Urine NEGATIVE NEGATIVE    Urobilinogen, Urine Normal Normal mg/dL    Nitrite, Urine NEGATIVE NEGATIVE    Leukocyte Esterase, Urine NEGATIVE NEGATIVE   Urinalysis Microscopic   Result Value Ref Range    WBC, Urine 1-5 1-5, NONE /HPF    RBC, Urine 6-10 (A) NONE, 1-2, 3-5 /HPF    CT abdomen pelvis w IV contrast    Result Date: 10/16/2024  Interpreted By:  Suri Moses, STUDY: CT ABDOMEN PELVIS W IV CONTRAST;  10/16/2024 6:09 pm   INDICATION: Signs/Symptoms:Diffuse abdominal pain with constipation, history of abdominal surgeries.   COMPARISON: CT abdomen pelvis 10/08/2017   ACCESSION NUMBER(S): [id number]   ORDERING CLINICIAN: ATLYA FRIED   TECHNIQUE: Axial CT of the abdomen and pelvis was performed following intravenous administration of 75 ml of contrast Omnipaque 350 with coronal and sagittal reconstruction.   FINDINGS: Motion limited examination.   Lower chest: No focal consolidation or pleural effusion.   Liver: No mass.   Biliary: Mild intrahepatic and CBD dilation up to 12 mm (series 301, image 30), new compared to 10/18/2017 CT. The distal CBD is not well assessed due to motion. Cholelithiasis without acute inflammation.   Spleen: No mass. No splenomegaly.   Pancreas: No mass or duct dilation.   Adrenals: Normal.   Kidneys: New multifocal left renal parenchymal scarring. Nonobstructing calculus in the left lower pole measuring 10 mm. There is an adjacent 2.1 cm slightly above water attenuation lesion in the left lower pole, incompletely characterized. No hydronephrosis in the right or left kidney.   GI tract: There is a loop of mid small bowel incarcerated within the  left inguinal hernia with adjacent inflammation and free fluid. There is associated upstream mid small bowel dilation with multiple air-fluid levels as well as fecalization. No definite pneumatosis identified. The distal small bowel and the colon are normal in caliber. Colonic diverticulosis without acute inflammation. Normal appendix.   Lymph nodes: No lymphadenopathy.   Mesentery/peritoneum: No free fluid, free air or fluid collection.   Vasculature: Mild abdominal aortic atherosclerotic calcifications without aneurysmal dilation. No portal venous gas.   Pelvis: No free fluid, free air or fluid collection. Underdistended bladder. The prostate is heterogeneous and enlarged measuring 5.6 cm in the transverse dimension impressing upon the bladder base; correlate with serum PSA.   Bones/Soft tissues: Prior right inguinal hernia repair without recurrence. Left inguinal hernia described above.Progression of multilevel degenerative disc disease. Grossly unchanged L4-L5 posterior instrumented fusion and L3-L4 and L4-L5 disc spacer placement. Bilateral hip osteoarthritis. Unchanged appearance bilateral posterior iliac bone, presumably postsurgical.       Incarcerated small bowel within left inguinal hernia with upstream mid small bowel obstruction. Surgical consultation advised.   New mild intrahepatic and extrahepatic bile duct dilation with limited assessment of distal CBD due to motion. Consider outpatient MRCP to exclude underlying obstructive process.   Incompletely characterized left renal lesion which can be assessed on the above suggested MRCP.   MACRO: Suri Moses discussed the significance and urgency of this critical finding by Epic secure chat with  TALYA FRIED on 10/16/2024 at 7:01 pm.  (**-RCF-**) Findings:  See findings.   Signed by: Suri Moses 10/16/2024 7:15 PM Dictation workstation:   [id number]      Assessment/Plan   Sreekanth Meza is a 69 y.o. male with a PMH of DM II, HTN, IBS, and chronic neck  and back pain who presented to the ED with complaints of left inguinal hernia that he could not reduce and was causing significant pain. General surgery was consulted for incarcerated left inguinal hernia.    Impression  #Incarcerated left inguinal hernia/SBO    > Hernia completely reduced in the ED    > Resolved pain and nausea    > Lactate normalized    Recommendations  -No indication for acute surgical intervention  -CLD for now due to mentation changes  -CBC and BMP in the a.m.  -KUB in the a.m.  -Barring any changes as an inpatient, patient should follow-up with Dr. Worley in 1 to 2 weeks for evaluation and scheduling of an elective left inguinal hernia repair.  -Remainder of care per primary team  -Consider outpatient ultrasound or MRCP for further evaluation of biliary ductal dilatation    Patient seen with attending on-call surgeon Dr. Brunilda Rosado, APRN-CNP

## 2024-10-22 NOTE — PROGRESS NOTES
"History Of Present Illness  HPI   The patient is a 69-year-old male who was admitted to the hospital on October 16, 2024 with an incarcerated left inguinal hernia resulting in a small bowel obstruction.  The hernia was reduced and his symptoms resolved.  He was discharged home the following day.  He now has no abdominal pain.  No nausea or vomiting.  The hernia has not popped out.  He reports that the left inguinal hernia was previously repaired in 2010.  He had repair of a right inguinal hernia in 2005.  He had anterior approach for a lumbar spine operation in the past.    Past medical history:  Diabetes mellitus type 2  Hypertension  Left great toe amputation  Chronic neck and back pain  EGD/colonoscopy on June 26, 2024    Allergies: Penicillin caused his tongue to swell a bit.  He did not require any treatment.    Past Medical History  He has a past medical history of Cellulitis of left lower limb and Unilateral inguinal hernia, without obstruction or gangrene, not specified as recurrent.    Surgical History  He has a past surgical history that includes Total knee arthroplasty (06/26/2013); Knee arthroscopy w/ debridement (06/26/2013); Cervical fusion (06/26/2013); Lumbar fusion (06/26/2013); and Knee arthroscopy w/ debridement (06/26/2013).     Allergies  Amoxicillin, Duloxetine, and Penicillin    Social History  He reports that he quit smoking about 59 years ago. His smoking use included cigarettes. He has never used smokeless tobacco. He reports that he does not drink alcohol and does not use drugs.    Family History  No family history on file.    Last Recorded Vitals  Blood pressure 158/85, pulse 71, temperature 36.7 °C (98 °F), temperature source Temporal, resp. rate 16, height 1.88 m (6' 2\"), weight 87.7 kg (193 lb 6.4 oz), SpO2 98%.    Physical Exam  Constitutional: Well-developed, well-nourished, alert and oriented, no acute distress  Skin: Warm and dry, no lesions, no rashes, no jaundice  HEENT: " Normocephalic, atraumatic, EOMI, no scleral icterus, eyes have no redness or swelling or discharge, external inspection of ears and nose is normal, mucous membranes moist  Neck: Soft, nontender, no mass or adenopathy  Cardiac: Regular rate and rhythm, no murmur  Chest: Patent airway, clear to auscultation, normal breath sounds with good chest expansion, no wheezes or rales or rhonchi noted, thorax symmetric  Abdomen: Nondistended, positive bowel sounds, soft, nontender, no mass.  Left lower quadrant transverse scar, bilateral inguinal scars.  No palpable inguinal or femoral hernias.  Rectal: Not performed  Extremities: No injury, no lower extremity edema or calf tenderness  Lymphatic: No cervical adenopathy  Musculoskeletal: Range of motion intact, no joint swelling, normal strength  Neurological: Alert and oriented x3, intact sensory and motor function, no obvious focal neurologic abnormalities, normal gait  Psychological: Appropriate mood and behavior  Patient declined a chaperone    Relevant Results  I have reviewed the CT abdomen/pelvis report and images from October 16, 2024.  IMPRESSION:  Incarcerated small bowel within left inguinal hernia with upstream  mid small bowel obstruction. Surgical consultation advised.    New mild intrahepatic and extrahepatic bile duct dilation with  limited assessment of distal CBD due to motion. Consider outpatient  MRCP to exclude underlying obstructive process.    Incompletely characterized left renal lesion which can be assessed on  the above suggested MRCP.     Assessment/Plan   Diagnoses and all orders for this visit:  Recurrent left inguinal hernia      69-year-old male with a recurrent left inguinal hernia which was recently incarcerated and resulted in a small bowel obstruction.  Recommend left inguinal herniorrhaphy with mesh either open or robotic assisted laparoscopic.  I discussed the procedure and risks with the patient. The risks include bleeding, infection, mesh  infection, recurrence, injury to surrounding structures such as nerves/blood vessels/intestine/bladder, chronic postop pain, cardiac/pulmonary complications.   If the mesh becomes infected it could require excision.  I discussed the alternative of hernia repair without mesh and observation.  The patient wishes to have the hernia repaired.  Because it is recurrent, I would recommend a robotic assisted laparoscopic left inguinal herniorrhaphy with mesh with possible bilateral repair and possible open operation.  He has increased risk of requiring an open operation due to the prior left lower quadrant abdominal operation.  Radiologist was also concerned about mild biliary ductal dilatation and recommended MRCP.  Liver function tests were normal.  The patient is asymptomatic and does not wish to have MRI or ultrasound performed for further evaluation.  I told the patient to follow-up with his primary care physician for further evaluation of the left renal lesion and enlarged prostate.  Increased risk of postop urinary retention due to prostatic hypertrophy.    Andrea Worley MD

## 2024-10-23 ENCOUNTER — PREP FOR PROCEDURE (OUTPATIENT)
Dept: SURGERY | Facility: CLINIC | Age: 69
End: 2024-10-23

## 2024-10-23 ENCOUNTER — APPOINTMENT (OUTPATIENT)
Dept: SURGERY | Facility: CLINIC | Age: 69
End: 2024-10-23
Payer: MEDICARE

## 2024-10-23 VITALS
BODY MASS INDEX: 24.82 KG/M2 | HEART RATE: 71 BPM | RESPIRATION RATE: 16 BRPM | HEIGHT: 74 IN | WEIGHT: 193.4 LBS | DIASTOLIC BLOOD PRESSURE: 85 MMHG | TEMPERATURE: 98 F | SYSTOLIC BLOOD PRESSURE: 158 MMHG | OXYGEN SATURATION: 98 %

## 2024-10-23 DIAGNOSIS — K40.91 RECURRENT LEFT INGUINAL HERNIA: Primary | ICD-10-CM

## 2024-10-23 PROCEDURE — 1036F TOBACCO NON-USER: CPT | Performed by: SURGERY

## 2024-10-23 PROCEDURE — 1126F AMNT PAIN NOTED NONE PRSNT: CPT | Performed by: SURGERY

## 2024-10-23 PROCEDURE — 1111F DSCHRG MED/CURRENT MED MERGE: CPT | Performed by: SURGERY

## 2024-10-23 PROCEDURE — 1159F MED LIST DOCD IN RCRD: CPT | Performed by: SURGERY

## 2024-10-23 PROCEDURE — 99214 OFFICE O/P EST MOD 30 MIN: CPT | Performed by: SURGERY

## 2024-10-23 PROCEDURE — 3008F BODY MASS INDEX DOCD: CPT | Performed by: SURGERY

## 2024-10-23 RX ORDER — BUPROPION HYDROCHLORIDE 100 MG/1
1 TABLET ORAL DAILY
COMMUNITY

## 2024-10-23 RX ORDER — HYDROCODONE BITARTRATE AND ACETAMINOPHEN 10; 325 MG/1; MG/1
TABLET ORAL 4 TIMES DAILY
COMMUNITY
Start: 2013-12-16

## 2024-10-23 RX ORDER — OXYCODONE AND ACETAMINOPHEN 5; 325 MG/1; MG/1
TABLET ORAL
COMMUNITY
Start: 2024-01-18

## 2024-10-23 RX ORDER — EPINEPHRINE 0.3 MG/.3ML
INJECTION SUBCUTANEOUS
COMMUNITY
Start: 2023-07-06

## 2024-10-23 RX ORDER — FLUCONAZOLE 100 MG/1
TABLET ORAL EVERY 24 HOURS
COMMUNITY
Start: 2022-11-15

## 2024-10-23 RX ORDER — NALOXONE HYDROCHLORIDE 4 MG/.1ML
4 SPRAY NASAL
COMMUNITY
Start: 2023-05-15

## 2024-10-23 RX ORDER — HYDROCHLOROTHIAZIDE 25 MG/1
TABLET ORAL
COMMUNITY
Start: 2012-12-13

## 2024-10-23 RX ORDER — ALLOPURINOL 300 MG/1
TABLET ORAL
COMMUNITY
Start: 2012-12-20

## 2024-10-23 RX ORDER — CLONAZEPAM 1 MG/1
TABLET ORAL
COMMUNITY
Start: 2013-11-18

## 2024-10-23 RX ORDER — CARISOPRODOL 350 MG/1
TABLET ORAL
COMMUNITY
Start: 2013-11-27

## 2024-10-23 RX ORDER — CLINDAMYCIN HYDROCHLORIDE 300 MG/1
CAPSULE ORAL
COMMUNITY
Start: 2022-11-30

## 2024-10-23 RX ORDER — HYDROCODONE BITARTRATE AND ACETAMINOPHEN 7.5; 325 MG/1; MG/1
TABLET ORAL
COMMUNITY
Start: 2013-12-16

## 2024-10-23 RX ORDER — LOPERAMIDE HYDROCHLORIDE 2 MG/1
2 CAPSULE ORAL
COMMUNITY
Start: 2023-09-13

## 2024-10-23 RX ORDER — CEFAZOLIN SODIUM 2 G/100ML
2 INJECTION, SOLUTION INTRAVENOUS ONCE
OUTPATIENT
Start: 2024-10-23 | End: 2024-10-23

## 2024-10-23 RX ORDER — LOPERAMIDE HYDROCHLORIDE 2 MG/1
CAPSULE ORAL
COMMUNITY

## 2024-10-23 RX ORDER — MIRTAZAPINE 15 MG/1
TABLET, FILM COATED ORAL
COMMUNITY

## 2024-10-23 RX ORDER — CEFEPIME HYDROCHLORIDE 2 G/1
INJECTION, POWDER, FOR SOLUTION INTRAVENOUS
COMMUNITY
Start: 2023-07-27

## 2024-10-23 RX ORDER — OXYCODONE AND ACETAMINOPHEN 7.5; 325 MG/1; MG/1
TABLET ORAL
COMMUNITY
Start: 2023-12-29

## 2024-10-23 RX ORDER — OMEPRAZOLE 20 MG/1
1 TABLET, DELAYED RELEASE ORAL DAILY
COMMUNITY

## 2024-10-23 RX ORDER — OXYCODONE AND ACETAMINOPHEN 10; 325 MG/1; MG/1
TABLET ORAL
COMMUNITY
Start: 2013-08-05

## 2024-10-23 RX ORDER — DOXYCYCLINE 100 MG/1
CAPSULE ORAL EVERY 12 HOURS
COMMUNITY
Start: 2023-05-09

## 2024-10-23 RX ORDER — LOSARTAN POTASSIUM AND HYDROCHLOROTHIAZIDE 25; 100 MG/1; MG/1
TABLET ORAL
COMMUNITY

## 2024-10-23 ASSESSMENT — PAIN SCALES - GENERAL: PAINLEVEL_OUTOF10: 0-NO PAIN

## 2024-10-23 NOTE — LETTER
October 23, 2024     Ja Acuna MD  [address]  [facility] [address]    Patient: Sreekanth Meza   YOB: 1955   Date of Visit: 10/23/2024       Dear Dr. Ja Acuna MD:    Thank you for referring Sreekanth Meza to me for evaluation. Below are my notes for this consultation.  If you have questions, please do not hesitate to call me. I look forward to following your patient along with you.       Sincerely,     Andrea Worley MD      CC: No Recipients  ______________________________________________________________________________________    History Of Present Illness  HPI   The patient is a 69-year-old male who was admitted to the hospital on October 16, 2024 with an incarcerated left inguinal hernia resulting in a small bowel obstruction.  The hernia was reduced and his symptoms resolved.  He was discharged home the following day.  He now has no abdominal pain.  No nausea or vomiting.  The hernia has not popped out.  He reports that the left inguinal hernia was previously repaired in 2010.  He had repair of a right inguinal hernia in 2005.  He had anterior approach for a lumbar spine operation in the past.    Past medical history:  Diabetes mellitus type 2  Hypertension  Left great toe amputation  Chronic neck and back pain  EGD/colonoscopy on June 26, 2024    Allergies: Penicillin caused his tongue to swell a bit.  He did not require any treatment.    Past Medical History  He has a past medical history of Cellulitis of left lower limb and Unilateral inguinal hernia, without obstruction or gangrene, not specified as recurrent.    Surgical History  He has a past surgical history that includes Total knee arthroplasty (06/26/2013); Knee arthroscopy w/ debridement (06/26/2013); Cervical fusion (06/26/2013); Lumbar fusion (06/26/2013); and Knee arthroscopy w/ debridement (06/26/2013).     Allergies  Amoxicillin, Duloxetine, and Penicillin    Social History  He reports that he quit smoking about 59  "years ago. His smoking use included cigarettes. He has never used smokeless tobacco. He reports that he does not drink alcohol and does not use drugs.    Family History  No family history on file.    Last Recorded Vitals  Blood pressure 158/85, pulse 71, temperature 36.7 °C (98 °F), temperature source Temporal, resp. rate 16, height 1.88 m (6' 2\"), weight 87.7 kg (193 lb 6.4 oz), SpO2 98%.    Physical Exam  Constitutional: Well-developed, well-nourished, alert and oriented, no acute distress  Skin: Warm and dry, no lesions, no rashes, no jaundice  HEENT: Normocephalic, atraumatic, EOMI, no scleral icterus, eyes have no redness or swelling or discharge, external inspection of ears and nose is normal, mucous membranes moist  Neck: Soft, nontender, no mass or adenopathy  Cardiac: Regular rate and rhythm, no murmur  Chest: Patent airway, clear to auscultation, normal breath sounds with good chest expansion, no wheezes or rales or rhonchi noted, thorax symmetric  Abdomen: Nondistended, positive bowel sounds, soft, nontender, no mass.  Left lower quadrant transverse scar, bilateral inguinal scars.  No palpable inguinal or femoral hernias.  Rectal: Not performed  Extremities: No injury, no lower extremity edema or calf tenderness  Lymphatic: No cervical adenopathy  Musculoskeletal: Range of motion intact, no joint swelling, normal strength  Neurological: Alert and oriented x3, intact sensory and motor function, no obvious focal neurologic abnormalities, normal gait  Psychological: Appropriate mood and behavior  Patient declined a chaperone    Relevant Results  I have reviewed the CT abdomen/pelvis report and images from October 16, 2024.  IMPRESSION:  Incarcerated small bowel within left inguinal hernia with upstream  mid small bowel obstruction. Surgical consultation advised.    New mild intrahepatic and extrahepatic bile duct dilation with  limited assessment of distal CBD due to motion. Consider outpatient  MRCP to " exclude underlying obstructive process.    Incompletely characterized left renal lesion which can be assessed on  the above suggested MRCP.     Assessment/Plan   Diagnoses and all orders for this visit:  Recurrent left inguinal hernia      69-year-old male with a recurrent left inguinal hernia which was recently incarcerated and resulted in a small bowel obstruction.  Recommend left inguinal herniorrhaphy with mesh either open or robotic assisted laparoscopic.  I discussed the procedure and risks with the patient. The risks include bleeding, infection, mesh infection, recurrence, injury to surrounding structures such as nerves/blood vessels/intestine/bladder, chronic postop pain, cardiac/pulmonary complications.   If the mesh becomes infected it could require excision.  I discussed the alternative of hernia repair without mesh and observation.  The patient wishes to have the hernia repaired.  Because it is recurrent, I would recommend a robotic assisted laparoscopic left inguinal herniorrhaphy with mesh with possible bilateral repair and possible open operation.  He has increased risk of requiring an open operation due to the prior left lower quadrant abdominal operation.  Radiologist was also concerned about mild biliary ductal dilatation and recommended MRCP.  Liver function tests were normal.  The patient is asymptomatic and does not wish to have MRI or ultrasound performed for further evaluation.  I told the patient to follow-up with his primary care physician for further evaluation of the left renal lesion and enlarged prostate.  Increased risk of postop urinary retention due to prostatic hypertrophy.    Andrea Worley MD

## 2024-11-19 NOTE — PROGRESS NOTES
"History Of Present Illness  HPI   October 23, 2024  The patient is a 69-year-old male who was admitted to the hospital on October 16, 2024 with an incarcerated left inguinal hernia resulting in a small bowel obstruction.  The hernia was reduced and his symptoms resolved.  He was discharged home the following day.  He now has no abdominal pain.  No nausea or vomiting.  The hernia has not popped out.  He reports that the left inguinal hernia was previously repaired in 2010.  He had repair of a right inguinal hernia in 2005.  He had anterior approach for a lumbar spine operation in the past.     November 22, 2024.  The patient reports no change in his hernia or medical problems.    Past medical history:  Diabetes mellitus type 2  Hypertension  Left great toe amputation  Chronic neck and back pain  EGD/colonoscopy on June 26, 2024     Allergies: Penicillin caused his tongue to swell a bit.  He did not require any treatment.    Past Medical History  He has a past medical history of Cellulitis of left lower limb and Unilateral inguinal hernia, without obstruction or gangrene, not specified as recurrent.    Surgical History  He has a past surgical history that includes Total knee arthroplasty (06/26/2013); Knee arthroscopy w/ debridement (06/26/2013); Cervical fusion (06/26/2013); Lumbar fusion (06/26/2013); and Knee arthroscopy w/ debridement (06/26/2013).     Allergies  Amoxicillin, Duloxetine, and Penicillin    Social History  He reports that he quit smoking about 59 years ago. His smoking use included cigarettes. He has never used smokeless tobacco. He reports that he does not drink alcohol and does not use drugs.    Family History  No family history on file.    Last Recorded Vitals  Blood pressure 138/77, pulse 66, temperature 36.6 °C (97.8 °F), temperature source Temporal, resp. rate 16, height 1.88 m (6' 2\"), weight 93.4 kg (206 lb), SpO2 97%.    Physical Exam   Constitutional: Well-developed, well-nourished, alert " and oriented, no acute distress  Skin: Warm and dry, no lesions, no rashes, no jaundice  HEENT: Normocephalic, atraumatic, EOMI, no scleral icterus, eyes have no redness or swelling or discharge, external inspection of ears and nose is normal, mucous membranes moist  Neck: Soft, nontender, no mass or adenopathy  Cardiac: Regular rate and rhythm, no murmur  Chest: Patent airway, clear to auscultation, normal breath sounds with good chest expansion, no wheezes or rales or rhonchi noted, thorax symmetric  Abdomen: Nondistended, positive bowel sounds, soft, nontender, no mass.  Left lower quadrant transverse scar, bilateral inguinal scars.  Left inguinal hernia, reducible.  No palpable right inguinal or femoral hernias.  Rectal: Not performed  Extremities: No injury, no lower extremity edema or calf tenderness  Lymphatic: No cervical adenopathy  Musculoskeletal: Range of motion intact, no joint swelling, normal strength  Neurological: Alert and oriented x3, intact sensory and motor function, no obvious focal neurologic abnormalities, normal gait  Psychological: Appropriate mood and behavior  Patient has previously declined a chaperone    Relevant Results     Assessment/Plan   Diagnoses and all orders for this visit:  Recurrent left inguinal hernia    69-year-old male with a recurrent left inguinal hernia which was recently incarcerated and resulted in a small bowel obstruction.  Now asymptomatic.  Recommend left inguinal herniorrhaphy with mesh either open or robotic assisted laparoscopic.  I discussed the procedure and risks with the patient. The risks include bleeding, infection, mesh infection, recurrence, injury to surrounding structures such as nerves/blood vessels/intestine/bladder, chronic postop pain, cardiac/pulmonary complications.   If the mesh becomes infected it could require excision.  I discussed the alternative of hernia repair without mesh and observation.  The patient wishes to have the hernia  repaired.  Because it is recurrent, I would recommend a robotic assisted laparoscopic left inguinal herniorrhaphy with mesh with possible bilateral repair and possible open operation.  He has increased risk of requiring an open operation due to the prior left lower quadrant abdominal operation.  He is scheduled for operation on December 5, 2024.  Electronic consent has been signed.    Andrea Worley MD

## 2024-11-22 ENCOUNTER — APPOINTMENT (OUTPATIENT)
Dept: SURGERY | Facility: CLINIC | Age: 69
End: 2024-11-22
Payer: MEDICARE

## 2024-11-22 VITALS
TEMPERATURE: 97.8 F | SYSTOLIC BLOOD PRESSURE: 138 MMHG | DIASTOLIC BLOOD PRESSURE: 77 MMHG | HEART RATE: 66 BPM | OXYGEN SATURATION: 97 % | BODY MASS INDEX: 26.44 KG/M2 | HEIGHT: 74 IN | RESPIRATION RATE: 16 BRPM | WEIGHT: 206 LBS

## 2024-11-22 DIAGNOSIS — K40.91 RECURRENT LEFT INGUINAL HERNIA: Primary | ICD-10-CM

## 2024-11-22 PROCEDURE — 99213 OFFICE O/P EST LOW 20 MIN: CPT | Performed by: SURGERY

## 2024-11-22 PROCEDURE — 1159F MED LIST DOCD IN RCRD: CPT | Performed by: SURGERY

## 2024-11-22 PROCEDURE — 1125F AMNT PAIN NOTED PAIN PRSNT: CPT | Performed by: SURGERY

## 2024-11-22 PROCEDURE — 1036F TOBACCO NON-USER: CPT | Performed by: SURGERY

## 2024-11-22 PROCEDURE — 3008F BODY MASS INDEX DOCD: CPT | Performed by: SURGERY

## 2024-11-22 RX ORDER — BACLOFEN 10 MG/1
1 TABLET ORAL 3 TIMES DAILY PRN
COMMUNITY
Start: 2024-11-15 | End: 2025-02-13

## 2024-11-22 ASSESSMENT — PAIN SCALES - GENERAL: PAINLEVEL_OUTOF10: 7

## 2024-11-22 NOTE — LETTER
November 22, 2024     Ja Acuna MD  [address]  [facility] [id number]    Patient: Sreekanth Meza   YOB: 1955   Date of Visit: 11/22/2024       Dear Dr. Ja Acuna MD:    Thank you for referring Sreekanth Meza to me for evaluation. Below are my notes for this consultation.  If you have questions, please do not hesitate to call me. I look forward to following your patient along with you.       Sincerely,     Andrea Worley MD      CC: No Recipients  ______________________________________________________________________________________    History Of Present Illness  HPI   October 23, 2024  The patient is a 69-year-old male who was admitted to the hospital on October 16, 2024 with an incarcerated left inguinal hernia resulting in a small bowel obstruction.  The hernia was reduced and his symptoms resolved.  He was discharged home the following day.  He now has no abdominal pain.  No nausea or vomiting.  The hernia has not popped out.  He reports that the left inguinal hernia was previously repaired in 2010.  He had repair of a right inguinal hernia in 2005.  He had anterior approach for a lumbar spine operation in the past.     November 22, 2024.  The patient reports no change in his hernia or medical problems.    Past medical history:  Diabetes mellitus type 2  Hypertension  Left great toe amputation  Chronic neck and back pain  EGD/colonoscopy on June 26, 2024     Allergies: Penicillin caused his tongue to swell a bit.  He did not require any treatment.    Past Medical History  He has a past medical history of Cellulitis of left lower limb and Unilateral inguinal hernia, without obstruction or gangrene, not specified as recurrent.    Surgical History  He has a past surgical history that includes Total knee arthroplasty (06/26/2013); Knee arthroscopy w/ debridement (06/26/2013); Cervical fusion (06/26/2013); Lumbar fusion (06/26/2013); and Knee arthroscopy w/ debridement (06/26/2013).    "  Allergies  Amoxicillin, Duloxetine, and Penicillin    Social History  He reports that he quit smoking about 59 years ago. His smoking use included cigarettes. He has never used smokeless tobacco. He reports that he does not drink alcohol and does not use drugs.    Family History  No family history on file.    Last Recorded Vitals  Blood pressure 138/77, pulse 66, temperature 36.6 °C (97.8 °F), temperature source Temporal, resp. rate 16, height 1.88 m (6' 2\"), weight 93.4 kg (206 lb), SpO2 97%.    Physical Exam   Constitutional: Well-developed, well-nourished, alert and oriented, no acute distress  Skin: Warm and dry, no lesions, no rashes, no jaundice  HEENT: Normocephalic, atraumatic, EOMI, no scleral icterus, eyes have no redness or swelling or discharge, external inspection of ears and nose is normal, mucous membranes moist  Neck: Soft, nontender, no mass or adenopathy  Cardiac: Regular rate and rhythm, no murmur  Chest: Patent airway, clear to auscultation, normal breath sounds with good chest expansion, no wheezes or rales or rhonchi noted, thorax symmetric  Abdomen: Nondistended, positive bowel sounds, soft, nontender, no mass.  Left lower quadrant transverse scar, bilateral inguinal scars.  Left inguinal hernia, reducible.  No palpable right inguinal or femoral hernias.  Rectal: Not performed  Extremities: No injury, no lower extremity edema or calf tenderness  Lymphatic: No cervical adenopathy  Musculoskeletal: Range of motion intact, no joint swelling, normal strength  Neurological: Alert and oriented x3, intact sensory and motor function, no obvious focal neurologic abnormalities, normal gait  Psychological: Appropriate mood and behavior  Patient has previously declined a chaperone    Relevant Results     Assessment/Plan  Diagnoses and all orders for this visit:  Recurrent left inguinal hernia    69-year-old male with a recurrent left inguinal hernia which was recently incarcerated and resulted in a " small bowel obstruction.  Now asymptomatic.  Recommend left inguinal herniorrhaphy with mesh either open or robotic assisted laparoscopic.  I discussed the procedure and risks with the patient. The risks include bleeding, infection, mesh infection, recurrence, injury to surrounding structures such as nerves/blood vessels/intestine/bladder, chronic postop pain, cardiac/pulmonary complications.   If the mesh becomes infected it could require excision.  I discussed the alternative of hernia repair without mesh and observation.  The patient wishes to have the hernia repaired.  Because it is recurrent, I would recommend a robotic assisted laparoscopic left inguinal herniorrhaphy with mesh with possible bilateral repair and possible open operation.  He has increased risk of requiring an open operation due to the prior left lower quadrant abdominal operation.  He is scheduled for operation on December 5, 2024.  Electronic consent has been signed.    Andrea Worley MD

## 2024-11-27 DIAGNOSIS — Z01.818 PRE-OP TESTING: Primary | ICD-10-CM

## 2024-12-04 RX ORDER — TRAZODONE HYDROCHLORIDE 50 MG/1
50 TABLET ORAL NIGHTLY
COMMUNITY

## 2024-12-04 RX ORDER — TAMSULOSIN HYDROCHLORIDE 0.4 MG/1
0.4 CAPSULE ORAL NIGHTLY
COMMUNITY

## 2024-12-04 NOTE — PREPROCEDURE INSTRUCTIONS
Current Medications   Medication Instructions    amLODIPine (Norvasc) 2.5 mg tablet Take morning of surgery with sip of water    baclofen (Lioresal) 10 mg tablet Continue until night before surgery    FLUoxetine (PROzac) 20 mg capsule Continue until night before surgery    gabapentin (Neurontin) 600 mg tablet Continue until night before surgery    loperamide (Imodium) 2 mg capsule Continue until night before surgery    losartan (Cozaar) 100 mg tablet Continue until night before surgery    meloxicam (Mobic) 15 mg tablet Hold today and morning of surgery    omeprazole (PriLOSEC) 40 mg DR capsule Continue until night before surgery    simvastatin (Zocor) 40 mg tablet Continue until night before surgery    tamsulosin (Flomax) 0.4 mg 24 hr capsule Continue until night before surgery    traZODone (Desyrel) 50 mg tablet Continue until night before surgery            NPO Instructions:    Do not eat any food after midnight the night before your surgery/procedure.  You may have 13 ounces of clear liquids until 5:30 AM. This includes water, black tea/coffee, (no milk or cream) apple juice and electrolyte drinks (Gatorade).    Additional Instructions:     Day of Surgery: Arrive in registration at 6:00 AM for 7:30 AM surgery    Enter through the main entrance of [facility], located at [address]. Proceed to registration, located in the back right hand corner. You will need your ID and insurance card for registration. Please ensure you have a responsible adult to drive you home, responsible adult does not include rideshare service drivers ([employer], Lyft, etc), cab drivers, or public transportation drivers.    Take a shower the morning of or night before your procedure. After you shower avoid lotions, powders, deodorants or anything applied to the skin. If you wear contacts or glasses, wear the glasses. If you do not have glasses, please bring a case for your contacts. You may wear hearing aids and dentures, bring  a case for them or we will provide one. Make sure you wear something loose and comfortable. Keep in mind your surgery type and wear something that will accommodate incisions or bandages. Please remove all jewelry.     For further questions Narcisa CARRENO can be contacted at [phone] between 7AM-3PM.

## 2024-12-05 ENCOUNTER — HOSPITAL ENCOUNTER (OUTPATIENT)
Dept: CARDIOLOGY | Facility: HOSPITAL | Age: 69
Discharge: HOME | End: 2024-12-05
Payer: MEDICARE

## 2024-12-05 ENCOUNTER — ANESTHESIA (OUTPATIENT)
Dept: OPERATING ROOM | Facility: HOSPITAL | Age: 69
End: 2024-12-05
Payer: MEDICARE

## 2024-12-05 ENCOUNTER — HOSPITAL ENCOUNTER (OUTPATIENT)
Facility: HOSPITAL | Age: 69
Setting detail: OUTPATIENT SURGERY
Discharge: HOME | End: 2024-12-05
Attending: SURGERY | Admitting: SURGERY
Payer: MEDICARE

## 2024-12-05 ENCOUNTER — ANESTHESIA EVENT (OUTPATIENT)
Dept: OPERATING ROOM | Facility: HOSPITAL | Age: 69
End: 2024-12-05
Payer: MEDICARE

## 2024-12-05 VITALS
TEMPERATURE: 97 F | DIASTOLIC BLOOD PRESSURE: 96 MMHG | HEART RATE: 91 BPM | SYSTOLIC BLOOD PRESSURE: 176 MMHG | BODY MASS INDEX: 26.44 KG/M2 | WEIGHT: 205.91 LBS | RESPIRATION RATE: 16 BRPM | OXYGEN SATURATION: 97 %

## 2024-12-05 DIAGNOSIS — K40.91 RECURRENT LEFT INGUINAL HERNIA: Primary | ICD-10-CM

## 2024-12-05 LAB
GLUCOSE BLD MANUAL STRIP-MCNC: 93 MG/DL (ref 74–99)
POTASSIUM SERPL-SCNC: 4.2 MMOL/L (ref 3.5–5.3)

## 2024-12-05 PROCEDURE — 49651 LAP ING HERNIA REPAIR RECUR: CPT | Performed by: SURGERY

## 2024-12-05 PROCEDURE — 2500000001 HC RX 250 WO HCPCS SELF ADMINISTERED DRUGS (ALT 637 FOR MEDICARE OP): Performed by: ANESTHESIOLOGY

## 2024-12-05 PROCEDURE — 2500000004 HC RX 250 GENERAL PHARMACY W/ HCPCS (ALT 636 FOR OP/ED): Performed by: SURGERY

## 2024-12-05 PROCEDURE — 3600000017 HC OR TIME - EACH INCREMENTAL 1 MINUTE - PROCEDURE LEVEL SIX: Performed by: SURGERY

## 2024-12-05 PROCEDURE — 7100000001 HC RECOVERY ROOM TIME - INITIAL BASE CHARGE: Performed by: SURGERY

## 2024-12-05 PROCEDURE — 2500000005 HC RX 250 GENERAL PHARMACY W/O HCPCS: Performed by: SURGERY

## 2024-12-05 PROCEDURE — 2720000007 HC OR 272 NO HCPCS: Performed by: SURGERY

## 2024-12-05 PROCEDURE — 7100000009 HC PHASE TWO TIME - INITIAL BASE CHARGE: Performed by: SURGERY

## 2024-12-05 PROCEDURE — 3700000002 HC GENERAL ANESTHESIA TIME - EACH INCREMENTAL 1 MINUTE: Performed by: SURGERY

## 2024-12-05 PROCEDURE — 3600000018 HC OR TIME - INITIAL BASE CHARGE - PROCEDURE LEVEL SIX: Performed by: SURGERY

## 2024-12-05 PROCEDURE — 2500000004 HC RX 250 GENERAL PHARMACY W/ HCPCS (ALT 636 FOR OP/ED): Performed by: NURSE ANESTHETIST, CERTIFIED REGISTERED

## 2024-12-05 PROCEDURE — 7100000002 HC RECOVERY ROOM TIME - EACH INCREMENTAL 1 MINUTE: Performed by: SURGERY

## 2024-12-05 PROCEDURE — 2780000003 HC OR 278 NO HCPCS: Performed by: SURGERY

## 2024-12-05 PROCEDURE — C1781 MESH (IMPLANTABLE): HCPCS | Performed by: SURGERY

## 2024-12-05 PROCEDURE — 82947 ASSAY GLUCOSE BLOOD QUANT: CPT

## 2024-12-05 PROCEDURE — 3700000001 HC GENERAL ANESTHESIA TIME - INITIAL BASE CHARGE: Performed by: SURGERY

## 2024-12-05 PROCEDURE — 2500000004 HC RX 250 GENERAL PHARMACY W/ HCPCS (ALT 636 FOR OP/ED): Mod: JZ | Performed by: SURGERY

## 2024-12-05 PROCEDURE — 84132 ASSAY OF SERUM POTASSIUM: CPT | Performed by: SURGERY

## 2024-12-05 PROCEDURE — 36415 COLL VENOUS BLD VENIPUNCTURE: CPT | Performed by: SURGERY

## 2024-12-05 PROCEDURE — 2500000004 HC RX 250 GENERAL PHARMACY W/ HCPCS (ALT 636 FOR OP/ED): Performed by: ANESTHESIOLOGY

## 2024-12-05 PROCEDURE — 93005 ELECTROCARDIOGRAM TRACING: CPT

## 2024-12-05 PROCEDURE — 7100000010 HC PHASE TWO TIME - EACH INCREMENTAL 1 MINUTE: Performed by: SURGERY

## 2024-12-05 DEVICE — LAPAROSCOPIC SELF-FIXATING MESH POLYESTER WITH POLYLACTIC ACID GRIPS AND COLLAGEN FILM
Type: IMPLANTABLE DEVICE | Site: INGUINAL | Status: FUNCTIONAL
Brand: PROGRIP

## 2024-12-05 RX ORDER — OXYCODONE HYDROCHLORIDE 5 MG/1
5 TABLET ORAL EVERY 6 HOURS PRN
Qty: 8 TABLET | Refills: 0 | Status: SHIPPED | OUTPATIENT
Start: 2024-12-05

## 2024-12-05 RX ORDER — FENTANYL CITRATE 50 UG/ML
INJECTION, SOLUTION INTRAMUSCULAR; INTRAVENOUS AS NEEDED
Status: DISCONTINUED | OUTPATIENT
Start: 2024-12-05 | End: 2024-12-05

## 2024-12-05 RX ORDER — CEFAZOLIN SODIUM 2 G/100ML
2 INJECTION, SOLUTION INTRAVENOUS ONCE
Status: COMPLETED | OUTPATIENT
Start: 2024-12-05 | End: 2024-12-05

## 2024-12-05 RX ORDER — LABETALOL HYDROCHLORIDE 5 MG/ML
INJECTION, SOLUTION INTRAVENOUS AS NEEDED
Status: DISCONTINUED | OUTPATIENT
Start: 2024-12-05 | End: 2024-12-05

## 2024-12-05 RX ORDER — METOPROLOL TARTRATE 1 MG/ML
5 INJECTION, SOLUTION INTRAVENOUS ONCE
Status: DISCONTINUED | OUTPATIENT
Start: 2024-12-05 | End: 2024-12-05 | Stop reason: HOSPADM

## 2024-12-05 RX ORDER — FAMOTIDINE 10 MG/ML
20 INJECTION INTRAVENOUS ONCE
Status: DISCONTINUED | OUTPATIENT
Start: 2024-12-05 | End: 2024-12-05 | Stop reason: HOSPADM

## 2024-12-05 RX ORDER — WATER 1 ML/ML
IRRIGANT IRRIGATION AS NEEDED
Status: DISCONTINUED | OUTPATIENT
Start: 2024-12-05 | End: 2024-12-05 | Stop reason: HOSPADM

## 2024-12-05 RX ORDER — SCOLOPAMINE TRANSDERMAL SYSTEM 1 MG/1
1 PATCH, EXTENDED RELEASE TRANSDERMAL ONCE
Status: DISCONTINUED | OUTPATIENT
Start: 2024-12-05 | End: 2024-12-05 | Stop reason: HOSPADM

## 2024-12-05 RX ORDER — ONDANSETRON HYDROCHLORIDE 2 MG/ML
4 INJECTION, SOLUTION INTRAVENOUS ONCE AS NEEDED
Status: DISCONTINUED | OUTPATIENT
Start: 2024-12-05 | End: 2024-12-05 | Stop reason: HOSPADM

## 2024-12-05 RX ORDER — SODIUM CHLORIDE, SODIUM LACTATE, POTASSIUM CHLORIDE, CALCIUM CHLORIDE 600; 310; 30; 20 MG/100ML; MG/100ML; MG/100ML; MG/100ML
100 INJECTION, SOLUTION INTRAVENOUS CONTINUOUS
Status: ACTIVE | OUTPATIENT
Start: 2024-12-05 | End: 2024-12-05

## 2024-12-05 RX ORDER — LABETALOL HYDROCHLORIDE 5 MG/ML
20 INJECTION, SOLUTION INTRAVENOUS ONCE
Status: COMPLETED | OUTPATIENT
Start: 2024-12-05 | End: 2024-12-05

## 2024-12-05 RX ORDER — MEPERIDINE HYDROCHLORIDE 25 MG/ML
12.5 INJECTION INTRAMUSCULAR; INTRAVENOUS; SUBCUTANEOUS EVERY 10 MIN PRN
Status: DISCONTINUED | OUTPATIENT
Start: 2024-12-05 | End: 2024-12-05 | Stop reason: HOSPADM

## 2024-12-05 RX ORDER — ONDANSETRON HYDROCHLORIDE 2 MG/ML
INJECTION, SOLUTION INTRAVENOUS AS NEEDED
Status: DISCONTINUED | OUTPATIENT
Start: 2024-12-05 | End: 2024-12-05

## 2024-12-05 RX ORDER — MORPHINE SULFATE 2 MG/ML
2 INJECTION, SOLUTION INTRAMUSCULAR; INTRAVENOUS EVERY 5 MIN PRN
Status: DISCONTINUED | OUTPATIENT
Start: 2024-12-05 | End: 2024-12-05 | Stop reason: HOSPADM

## 2024-12-05 RX ORDER — HYDRALAZINE HYDROCHLORIDE 20 MG/ML
10 INJECTION INTRAMUSCULAR; INTRAVENOUS EVERY 30 MIN PRN
Status: DISCONTINUED | OUTPATIENT
Start: 2024-12-05 | End: 2024-12-05 | Stop reason: HOSPADM

## 2024-12-05 RX ORDER — HYDRALAZINE HYDROCHLORIDE 20 MG/ML
INJECTION INTRAMUSCULAR; INTRAVENOUS AS NEEDED
Status: DISCONTINUED | OUTPATIENT
Start: 2024-12-05 | End: 2024-12-05

## 2024-12-05 RX ORDER — ALBUTEROL SULFATE 0.83 MG/ML
2.5 SOLUTION RESPIRATORY (INHALATION) ONCE AS NEEDED
Status: DISCONTINUED | OUTPATIENT
Start: 2024-12-05 | End: 2024-12-05 | Stop reason: HOSPADM

## 2024-12-05 RX ORDER — DIPHENHYDRAMINE HYDROCHLORIDE 50 MG/ML
25 INJECTION INTRAMUSCULAR; INTRAVENOUS ONCE AS NEEDED
Status: DISCONTINUED | OUTPATIENT
Start: 2024-12-05 | End: 2024-12-05 | Stop reason: HOSPADM

## 2024-12-05 RX ORDER — LABETALOL HYDROCHLORIDE 5 MG/ML
10 INJECTION, SOLUTION INTRAVENOUS ONCE AS NEEDED
Status: COMPLETED | OUTPATIENT
Start: 2024-12-05 | End: 2024-12-05

## 2024-12-05 RX ORDER — MIDAZOLAM HYDROCHLORIDE 1 MG/ML
1 INJECTION, SOLUTION INTRAMUSCULAR; INTRAVENOUS ONCE AS NEEDED
Status: DISCONTINUED | OUTPATIENT
Start: 2024-12-05 | End: 2024-12-05 | Stop reason: HOSPADM

## 2024-12-05 RX ORDER — ROCURONIUM BROMIDE 10 MG/ML
INJECTION, SOLUTION INTRAVENOUS AS NEEDED
Status: DISCONTINUED | OUTPATIENT
Start: 2024-12-05 | End: 2024-12-05

## 2024-12-05 RX ORDER — HYDROMORPHONE HYDROCHLORIDE 1 MG/ML
1 INJECTION, SOLUTION INTRAMUSCULAR; INTRAVENOUS; SUBCUTANEOUS EVERY 5 MIN PRN
Status: DISCONTINUED | OUTPATIENT
Start: 2024-12-05 | End: 2024-12-05 | Stop reason: HOSPADM

## 2024-12-05 RX ORDER — ACETAMINOPHEN 325 MG/1
975 TABLET ORAL ONCE
Status: COMPLETED | OUTPATIENT
Start: 2024-12-05 | End: 2024-12-05

## 2024-12-05 RX ORDER — PROPOFOL 10 MG/ML
INJECTION, EMULSION INTRAVENOUS AS NEEDED
Status: DISCONTINUED | OUTPATIENT
Start: 2024-12-05 | End: 2024-12-05

## 2024-12-05 RX ORDER — BUPIVACAINE HYDROCHLORIDE 5 MG/ML
INJECTION, SOLUTION PERINEURAL AS NEEDED
Status: DISCONTINUED | OUTPATIENT
Start: 2024-12-05 | End: 2024-12-05 | Stop reason: HOSPADM

## 2024-12-05 SDOH — HEALTH STABILITY: MENTAL HEALTH: CURRENT SMOKER: 0

## 2024-12-05 ASSESSMENT — PAIN SCALES - GENERAL
PAINLEVEL_OUTOF10: 4
PAINLEVEL_OUTOF10: 6
PAINLEVEL_OUTOF10: 6
PAINLEVEL_OUTOF10: 4
PAINLEVEL_OUTOF10: 2
PAINLEVEL_OUTOF10: 4
PAINLEVEL_OUTOF10: 0 - NO PAIN
PAINLEVEL_OUTOF10: 0 - NO PAIN
PAINLEVEL_OUTOF10: 4
PAINLEVEL_OUTOF10: 6
PAINLEVEL_OUTOF10: 4
PAINLEVEL_OUTOF10: 5 - MODERATE PAIN
PAINLEVEL_OUTOF10: 4
PAINLEVEL_OUTOF10: 4
PAINLEVEL_OUTOF10: 2
PAINLEVEL_OUTOF10: 6
PAINLEVEL_OUTOF10: 4

## 2024-12-05 ASSESSMENT — PAIN - FUNCTIONAL ASSESSMENT
PAIN_FUNCTIONAL_ASSESSMENT: 0-10

## 2024-12-05 ASSESSMENT — PAIN DESCRIPTION - DESCRIPTORS
DESCRIPTORS: SORE
DESCRIPTORS: BURNING;ACHING;SORE

## 2024-12-05 NOTE — ANESTHESIA PROCEDURE NOTES
Airway  Date/Time: 12/5/2024 7:44 AM  Urgency: elective    Airway not difficult    Staffing  Performed: CRNA   Authorized by: Ja Burroughs MD    Performed by: KALI Dougherty-CRNA  Patient location during procedure: OR    Indications and Patient Condition  Indications for airway management: anesthesia  Spontaneous Ventilation: absent  Sedation level: deep  Preoxygenated: yes  Patient position: sniffing  Mask difficulty assessment: 1 - vent by mask    Final Airway Details  Final airway type: endotracheal airway      Successful airway: ETT  Cuffed: yes   Successful intubation technique: direct laryngoscopy  Endotracheal tube insertion site: oral  Blade: Irma  Blade size: #3  ETT size (mm): 7.5  Cormack-Lehane Classification: grade IIa - partial view of glottis  Placement verified by: chest auscultation and capnometry   Measured from: lips  ETT to lips (cm): 23  Number of attempts at approach: 1

## 2024-12-05 NOTE — ANESTHESIA PREPROCEDURE EVALUATION
"Patient: Adrián Meza \"Sreekanth\"    Procedure Information       Date/Time: 12/05/24 0730    Procedure: ROBOTIC ASSISTED LAPAROSCOPIC LEFT POSSIBLE BILATERAL INGUINAL HERNIA REPAIR WITH MESH/ POSSIBLE OPEN (Left) - Robotic assisted laparoscopic left inguinal herniorrhaphy with mesh.  Possible bilateral hernia repair.  Possible open operation.    Location: [facility] / [facility]    Surgeons: Andrea Worley MD            Relevant Problems   Anesthesia   (-) Difficult intubation   (-) PONV (postoperative nausea and vomiting)      Musculoskeletal   (+) Disc degeneration, lumbosacral   (+) Lumbar canal stenosis       Clinical information reviewed:   Tobacco  Allergies  Meds   Med Hx  Surg Hx   Fam Hx  Soc Hx        NPO Detail:  NPO/Void Status  Carbohydrate Drink Given Prior to Surgery? : N  Date of Last Liquid: 12/04/24  Time of Last Liquid: 0400  Date of Last Solid: 12/04/24  Time of Last Solid: 2200  Last Intake Type: Solid meal  Time of Last Void: 0630         Physical Exam    Airway  Mallampati: II  TM distance: >3 FB  Neck ROM: full     Cardiovascular - normal exam  Rhythm: regular  Rate: normal     Dental    Pulmonary - normal exam     Abdominal            Anesthesia Plan    History of general anesthesia?: yes  History of complications of general anesthesia?: no    ASA 3     general     The patient is not a current smoker.  Education provided regarding risk of obstructive sleep apnea.  intravenous induction   Postoperative administration of opioids is intended.  Trial extubation is planned.  Anesthetic plan and risks discussed with patient.    Plan discussed with CRNA, CAA and attending.      "

## 2024-12-05 NOTE — OP NOTE
"ROBOTIC ASSISTED LAPAROSCOPIC LEFT INCARCERATED  INGUINAL HERNIA REPAIR WITH MESH (L) Operative Note     Date: 2024  OR Location: [facility]    Name: Adrián Meza \"Pranav", : 1955, Age: 69 y.o., MRN: [id number], Sex: male    Diagnosis  Pre-op Diagnosis      * Recurrent left inguinal hernia [K40.91] Post-op Diagnosis     * Recurrent left inguinal hernia [K40.91]     Procedures  ROBOTIC ASSISTED LAPAROSCOPIC LEFT INCARCERATED  INGUINAL HERNIA REPAIR WITH MESH  48618 - [place] LAPS SURG RPR RECURRENT INGUINAL HERNIA      Surgeons      * Andrea Worley - Primary    Resident/Fellow/Other Assistant:  Surgeons and Role:  * No surgeons found with a matching role *    Staff:   Circulator: Ashlyn Peguero Person: Viviana  Surgical Assistant: Andrea    Anesthesia Staff: Anesthesiologist: Ja Burroughs MD  CRNA: KALI Dougherty-CRNA    Procedure Summary  Anesthesia: General  ASA: III  Estimated Blood Loss: 10mL  Intra-op Medications: * Intraprocedure medication information is unavailable because the case start and end events have not been set *           Anesthesia Record               Intraprocedure I/O Totals       None           Specimen: No specimens collected              Drains and/or Catheters:   Urethral Catheter Non-latex 16 Fr. (Active)       Tourniquet Times:         Implants:  Implants       Type Name Action Serial No.      Surgical Mesh Sling Implant MESH, PROGRIP LAP, 10 X 15 CM, FLATSHEET - [id number] Implanted               Findings: Left indirect recurrent incarcerated hernia    Indications: Adrián Meza \"Pranav" is an 69 y.o. male who is having surgery for Recurrent left inguinal hernia [K40.91].     The patient was seen in the preoperative area. The risks, benefits, complications, treatment options, non-operative alternatives, expected recovery and outcomes were discussed with the patient. The possibilities of reaction to medication, pulmonary aspiration, injury to surrounding structures, " bleeding, recurrent infection, the need for additional procedures, failure to diagnose a condition, and creating a complication requiring transfusion or operation were discussed with the patient. The patient concurred with the proposed plan, giving informed consent.  The site of surgery was properly noted/marked if necessary per policy. The patient has been actively warmed in preoperative area. Preoperative antibiotics have been ordered and given within 1 hours of incision. Venous thrombosis prophylaxis have been ordered including bilateral sequential compression devices    Procedure Details: Following informed consent the patient was taken to the operating room and placed in supine position.  A huddle was performed.  The patient was given general anesthetic.  Sequential compression devices were in place and functioning.  The patient received Ancef IV in the operating room.  The abdomen was prepped and draped in sterile fashion.  A timeout was performed.  A left upper quadrant skin incision was made at Saure's point and a 5 mm Visiport inserted under direct vision.  The abdomen was insufflated with carbon dioxide to a pressure of 15 mmHg.  An 8 mm port was placed into the right upper quadrant under direct vision and another 8 mm port placed into the epigastric area just off midline under direct vision.  The left upper quadrant 5 mm port was exchanged for an 8 mm port.  The patient was placed into Trendelenburg position.  The robot was docked.  The pelvis was examined.  The patient was found to have a [left indirect incarcerated] inguinal hernia.  The hernia contained a large piece of epiploic fat from the sigmoid colon.  The hernia was reduced with dissection of the adhesions to the hernia sac and retraction of the incarcerated fatty tissue and external pressure.  A hockey-stick shaped peritoneal incision was made in the [left] pelvis.  The region of the myopectineal orifice was dissected.  Dissection continued 2  cm across midline and 2 cm posterior to Isael's ligament.  Lateral dissection was performed.  The spermatic vessels and vas deferens were parietalized.  [The indirect hernia sac was]reduced.  Hemostasis appeared to be excellent.  A 10 x 15 cm diameter ProGrip mesh was inserted and secured.  This mesh provided wide overlap of the myopectineal orifice.  Insufflation pressure was decreased to 10 mmHg.  The peritoneum was reapproximated with a running absorbable barbed suture.  The abdomen was deflated and the ports were removed.  Half percent plain Marcaine was infiltrated into each of the fascial incisions.  Skin incisions were closed with running 4-0 Vicryl subcuticular sutures.  Dressings were placed and the patient was taken to the recovery room in satisfactory condition having tolerated the procedure well.  Counts were correct.  Complications:  None; patient tolerated the procedure well.    Disposition: PACU - hemodynamically stable.  Condition: stable         Task Performed by RNFA or Surgical Assistant:  Exchange of robotic instruments.  Skin closure.          Andrea Worley  Phone Number: [phone]

## 2024-12-05 NOTE — ANESTHESIA POSTPROCEDURE EVALUATION
"Patient: Adrián Meza \"Sreekanth\"    Procedure Summary       Date: 12/05/24 Room / Location: [facility]    Anesthesia Start: 0733 Anesthesia Stop: 0934    Procedure: ROBOTIC ASSISTED LAPAROSCOPIC LEFT INCARCERATED  INGUINAL HERNIA REPAIR WITH MESH (Left: Abdomen) Diagnosis:       Recurrent left inguinal hernia      (Recurrent left inguinal hernia [K40.91])    Surgeons: Andrea Worley MD Responsible Provider: Ja Burroughs MD    Anesthesia Type: general ASA Status: 3            Anesthesia Type: general    Vitals Value Taken Time   /86 12/05/24 1000   Temp 36.1 °C (97 °F) 12/05/24 0930   Pulse 77 12/05/24 1010   Resp 16 12/05/24 1000   SpO2 98 % 12/05/24 1010   Vitals shown include unfiled device data.    Anesthesia Post Evaluation    Patient location during evaluation: PACU  Patient participation: complete - patient participated  Level of consciousness: awake and alert  Pain management: satisfactory to patient  Airway patency: patent  Cardiovascular status: acceptable  Respiratory status: acceptable  Hydration status: acceptable  Postoperative Nausea and Vomiting: none        No notable events documented.    "

## 2024-12-05 NOTE — INTERVAL H&P NOTE
H&P reviewed. The patient was examined and there are no changes to the H&P.   4 or more times a week

## 2024-12-06 LAB
ATRIAL RATE: 66 BPM
P AXIS: 49 DEGREES
P OFFSET: 177 MS
P ONSET: 127 MS
PR INTERVAL: 158 MS
Q ONSET: 206 MS
QRS COUNT: 11 BEATS
QRS DURATION: 100 MS
QT INTERVAL: 402 MS
QTC CALCULATION(BAZETT): 421 MS
QTC FREDERICIA: 415 MS
R AXIS: 7 DEGREES
T AXIS: 10 DEGREES
T OFFSET: 407 MS
VENTRICULAR RATE: 66 BPM

## 2024-12-06 RX ORDER — MIDAZOLAM HYDROCHLORIDE 1 MG/ML
INJECTION, SOLUTION INTRAMUSCULAR; INTRAVENOUS AS NEEDED
Status: DISCONTINUED | OUTPATIENT
Start: 2024-12-05 | End: 2024-12-06

## 2024-12-22 PROBLEM — Z09 POSTOP CHECK: Status: ACTIVE | Noted: 2024-12-22

## 2024-12-22 NOTE — PROGRESS NOTES
"History Of Present Illness  Adrián Meza \"Sreekanth\" is a 69 y.o. male status post robotic assisted laparoscopic repair of a recurrent incarcerated left indirect inguinal hernia on December 5, 2024.  The patient returned to normal daily activity 1 or 2 days postop.  He did not take any oxycodone.  No abdominal pain.  No complaints.     Last Recorded Vitals  Blood pressure 156/79, pulse 69, temperature 36.4 °C (97.6 °F), temperature source Temporal, resp. rate 17, height 1.88 m (6' 2\"), weight 93 kg (205 lb), SpO2 97%.    Physical Exam  General: Well-developed, well-nourished, no acute distress, alert and oriented  Wound: Intact, no erythema or signs of infection    Assessment/Plan   Diagnoses and all orders for this visit:  Postop check  Recovering well.  Follow-up as needed.      Andrea Worley MD  "

## 2024-12-23 ENCOUNTER — APPOINTMENT (OUTPATIENT)
Dept: SURGERY | Facility: CLINIC | Age: 69
End: 2024-12-23
Payer: MEDICARE

## 2024-12-23 VITALS
HEIGHT: 74 IN | OXYGEN SATURATION: 97 % | DIASTOLIC BLOOD PRESSURE: 79 MMHG | HEART RATE: 69 BPM | TEMPERATURE: 97.6 F | SYSTOLIC BLOOD PRESSURE: 156 MMHG | RESPIRATION RATE: 17 BRPM | WEIGHT: 205 LBS | BODY MASS INDEX: 26.31 KG/M2

## 2024-12-23 DIAGNOSIS — Z09 POSTOP CHECK: Primary | ICD-10-CM

## 2024-12-23 PROCEDURE — 1159F MED LIST DOCD IN RCRD: CPT | Performed by: SURGERY

## 2024-12-23 PROCEDURE — 3008F BODY MASS INDEX DOCD: CPT | Performed by: SURGERY

## 2024-12-23 PROCEDURE — 99024 POSTOP FOLLOW-UP VISIT: CPT | Performed by: SURGERY

## 2024-12-23 NOTE — LETTER
"December 23, 2024     Ja Acuna MD  [address]  [place] [address]    Patient: Sreekanth Meza   YOB: 1955   Date of Visit: 12/23/2024       Dear Dr. Ja Acuna MD:    Thank you for referring Sreekanth Meza to me for evaluation. Below are my notes for this consultation.  If you have questions, please do not hesitate to call me. I look forward to following your patient along with you.       Sincerely,     Andrea Worley MD      CC: No Recipients  ______________________________________________________________________________________    History Of Present Illness  Adrián Meza \"Pranav" is a 69 y.o. male status post robotic assisted laparoscopic repair of a recurrent incarcerated left indirect inguinal hernia on December 5, 2024.  The patient returned to normal daily activity 1 or 2 days postop.  He did not take any oxycodone.  No abdominal pain.  No complaints.     Last Recorded Vitals  Blood pressure 156/79, pulse 69, temperature 36.4 °C (97.6 °F), temperature source Temporal, resp. rate 17, height 1.88 m (6' 2\"), weight 93 kg (205 lb), SpO2 97%.    Physical Exam  General: Well-developed, well-nourished, no acute distress, alert and oriented  Wound: Intact, no erythema or signs of infection    Assessment/Plan  Diagnoses and all orders for this visit:  Postop check  Recovering well.  Follow-up as needed.      Andrea Worley MD    "

## 2025-03-06 ENCOUNTER — TELEPHONE (OUTPATIENT)
Dept: RADIOLOGY | Facility: HOSPITAL | Age: 70
End: 2025-03-06
Payer: MEDICARE

## 2025-03-06 NOTE — TELEPHONE ENCOUNTER
Attempted outreach by phone to Adrián Meza to discuss yearly lung cancer screening. Patient was not available. I left a voicemail with and requested a return call directly to the lung cancer screening office to speak with one of the navigators regarding continued screening. The clinics phone number was provided for his convenience.

## 2025-05-22 ENCOUNTER — ANCILLARY PROCEDURE (OUTPATIENT)
Dept: URGENT CARE | Age: 70
End: 2025-05-22
Payer: MEDICARE

## 2025-05-22 ENCOUNTER — OFFICE VISIT (OUTPATIENT)
Dept: URGENT CARE | Age: 70
End: 2025-05-22
Payer: MEDICARE

## 2025-05-22 VITALS
SYSTOLIC BLOOD PRESSURE: 146 MMHG | WEIGHT: 210 LBS | DIASTOLIC BLOOD PRESSURE: 78 MMHG | TEMPERATURE: 98.1 F | HEART RATE: 77 BPM | RESPIRATION RATE: 17 BRPM | BODY MASS INDEX: 26.11 KG/M2 | HEIGHT: 75 IN | OXYGEN SATURATION: 98 %

## 2025-05-22 DIAGNOSIS — M54.50 LOW BACK PAIN, UNSPECIFIED BACK PAIN LATERALITY, UNSPECIFIED CHRONICITY, UNSPECIFIED WHETHER SCIATICA PRESENT: ICD-10-CM

## 2025-05-22 DIAGNOSIS — G89.29 CHRONIC LOW BACK PAIN WITHOUT SCIATICA, UNSPECIFIED BACK PAIN LATERALITY: ICD-10-CM

## 2025-05-22 DIAGNOSIS — R39.9 URINARY SYMPTOM OR SIGN: Primary | ICD-10-CM

## 2025-05-22 DIAGNOSIS — M54.50 CHRONIC LOW BACK PAIN WITHOUT SCIATICA, UNSPECIFIED BACK PAIN LATERALITY: ICD-10-CM

## 2025-05-22 DIAGNOSIS — R52 PAIN, UNSPECIFIED: ICD-10-CM

## 2025-05-22 LAB
POC APPEARANCE, URINE: CLEAR
POC BILIRUBIN, URINE: NEGATIVE
POC BLOOD, URINE: NEGATIVE
POC COLOR, URINE: YELLOW
POC GLUCOSE, URINE: NEGATIVE MG/DL
POC KETONES, URINE: NEGATIVE MG/DL
POC LEUKOCYTES, URINE: NEGATIVE
POC NITRITE,URINE: NEGATIVE
POC PH, URINE: 6 PH
POC PROTEIN, URINE: NEGATIVE MG/DL
POC SPECIFIC GRAVITY, URINE: 1.01
POC UROBILINOGEN, URINE: 0.2 EU/DL

## 2025-05-22 PROCEDURE — 1160F RVW MEDS BY RX/DR IN RCRD: CPT | Performed by: PHYSICIAN ASSISTANT

## 2025-05-22 PROCEDURE — 3008F BODY MASS INDEX DOCD: CPT | Performed by: PHYSICIAN ASSISTANT

## 2025-05-22 PROCEDURE — 81003 URINALYSIS AUTO W/O SCOPE: CPT | Performed by: PHYSICIAN ASSISTANT

## 2025-05-22 PROCEDURE — 1036F TOBACCO NON-USER: CPT | Performed by: PHYSICIAN ASSISTANT

## 2025-05-22 PROCEDURE — 72100 X-RAY EXAM L-S SPINE 2/3 VWS: CPT | Performed by: PHYSICIAN ASSISTANT

## 2025-05-22 PROCEDURE — 99070 SPECIAL SUPPLIES PHYS/QHP: CPT | Performed by: PHYSICIAN ASSISTANT

## 2025-05-22 PROCEDURE — 99204 OFFICE O/P NEW MOD 45 MIN: CPT | Performed by: PHYSICIAN ASSISTANT

## 2025-05-22 PROCEDURE — 1159F MED LIST DOCD IN RCRD: CPT | Performed by: PHYSICIAN ASSISTANT

## 2025-05-22 RX ORDER — PREDNISONE 20 MG/1
TABLET ORAL
Qty: 18 TABLET | Refills: 0 | Status: SHIPPED | OUTPATIENT
Start: 2025-05-22 | End: 2025-05-31

## 2025-05-22 ASSESSMENT — PATIENT HEALTH QUESTIONNAIRE - PHQ9
SUM OF ALL RESPONSES TO PHQ9 QUESTIONS 1 AND 2: 0
2. FEELING DOWN, DEPRESSED OR HOPELESS: NOT AT ALL
1. LITTLE INTEREST OR PLEASURE IN DOING THINGS: NOT AT ALL

## 2025-05-22 NOTE — PROGRESS NOTES
"Subjective   Patient ID: Adrián Meza \"Pranav" is a 69 y.o. male. They present today with a chief complaint of Back Pain (Lower back pain x5 days pain also moves up to the abdomen (history of kidney stones)).    Patient disposition: Home    HISTORY OF PRESENT ILLNESS:    This is an older adult male with a PMH of back pain issues as well as kidney stones. He presents for 3 days of worse than normal R-sided nonradiating LBP. Was severe earlier today, now mild. Denies any bowel/bladder dysfunction including urinary retention or bowel incontinence, denies abdominal pain, denies saddle paresthesias/anaesthesia, denies leg weakness, denies fever/chills/sweats, denies history of IV drug use or cancer. He uses THC and APAP PRN for back pain.    Past Medical History  Allergies as of 05/22/2025 - Reviewed 05/22/2025   Allergen Reaction Noted    Amoxicillin Unknown 07/14/2011    Duloxetine Unknown 07/14/2011    Penicillin Swelling 06/17/2024       Prescriptions Prior to Admission[1]     Medical History[2]    Surgical History[3]     reports that he quit smoking about 59 years ago. His smoking use included cigarettes. He has never used smokeless tobacco. He reports that he does not drink alcohol and does not use drugs.    Review of Systems    Negative except as documented in the History of Present Illness.                             Objective    Vitals:    05/22/25 1029   BP: 146/78   BP Location: Left arm   Patient Position: Sitting   BP Cuff Size: Adult   Pulse: 77   Resp: 17   Temp: 36.7 °C (98.1 °F)   TempSrc: Oral   SpO2: 98%   Weight: 95.3 kg (210 lb)   Height: 1.905 m (6' 3\")     No LMP for male patient.      PHYSICAL EXAMINATION:    CONSTITUTIONAL: well-appearing, nontoxic    EYES:  No scleral icterus or orbital trauma noted. No conjunctival injection. PERRLA. EOMI b/l. No nystagmus.    HEENT:  Head and face are unremarkable and atraumatic. Mucous membranes moist. Nares are patent without copious rhinorrhea.  No " lymphadenopathy.    LUNGS:  CTAB, no r/r/w. No dullness to percussion.    CARDIOVASCULAR:   Normal dorsalis pedis and posterior tibialis pulses bilaterally.    ABDOMEN:  Nontender, nondistended, with no obvious masses, and no peritoneal signs.  No bruits heard on auscultation, normal bowel sounds in 4 quadrants.     : CVAT neg bl    MUSCULOSKELETAL: Moving all extremities. Ankle strength is 5 out of 5 bilateral flexion and extension.    The thoracic and cervical spine are nontender throughout.    Lumbosacral spine is TTP L1-L3 level.    Straight leg raise is negative bl.    Gait antalgic and using cane.    SKIN:   Normal skin exam of the abdomen and back.    NEURO:  Normal baseline mental status. Patellar reflexes are normal bilaterally. No decreased motor or sensation in the distal lower extremities bilaterally. No saddle anesthesia.    PSYCH: Appropriate mood and affect.         ---------------------------------------------------------------         MDM:  UA unremarkable and culture pending.    Lumbosacral XR interpreted by me independently demonstrated chronic and postop changes, no acute wedge/compression fx.    The potential side effects of corticosteroid use were discussed at length with the patient. These risks include but are not limited to: difficulty sleeping, increased appetite, fluid retention, mood changes, weight gain, change in blood pressure, high blood glucose, possible adrenal suppression, osteoporosis, avascular necrosis of the hip, menstrual irregularities (if applicable), increased risk of infection, and cataracts. The patient understands these risks and is willing to proceed with steroid therapy. Prednisone Rx today and will continue APAP/THC PRN for pain control as well.     The patient was screened for and is not exhibiting any of the concerning signs or symptoms associated with cauda equina syndrome in the history or on the examination today.        Procedures    Diagnostic study results  (if any) were reviewed by Tony Madden PA-C.    Results for orders placed or performed in visit on 05/22/25   POCT UA Automated manually resulted   Result Value Ref Range    POC Color, Urine Yellow Straw, Yellow, Light-Yellow    POC Appearance, Urine Clear Clear    POC Glucose, Urine NEGATIVE NEGATIVE mg/dl    POC Bilirubin, Urine NEGATIVE NEGATIVE    POC Ketones, Urine NEGATIVE NEGATIVE mg/dl    POC Specific Gravity, Urine 1.010 1.005 - 1.035    POC Blood, Urine NEGATIVE NEGATIVE    POC PH, Urine 6.0 No Reference Range Established PH    POC Protein, Urine NEGATIVE NEGATIVE mg/dl    POC Urobilinogen, Urine 0.2 0.2, 1.0 EU/DL    Poc Nitrite, Urine NEGATIVE NEGATIVE    POC Leukocytes, Urine NEGATIVE NEGATIVE        Assessment/Plan   Allergies, medications, history, and pertinent labs/EKGs/Imaging reviewed by Tony Madden PA-C.     Orders and Diagnoses  Diagnoses and all orders for this visit:  Low back pain, unspecified back pain laterality, unspecified chronicity, unspecified whether sciatica present  -     POCT UA Automated manually resulted      Medical Admin Record      Follow Up Instructions  No follow-ups on file.    Electronically signed by Tony Madden PA-C  11:06 AM         [1] (Not in a hospital admission)  [2]   Past Medical History:  Diagnosis Date    Cellulitis of left lower limb     Cellulitis of left leg    Unilateral inguinal hernia, without obstruction or gangrene, not specified as recurrent     Inguinal hernia   [3]   Past Surgical History:  Procedure Laterality Date    CERVICAL FUSION  06/26/2013    Cervical Vertebral Fusion    KNEE ARTHROSCOPY W/ DEBRIDEMENT  06/26/2013    Arthroscopy Knee Left    KNEE ARTHROSCOPY W/ DEBRIDEMENT  06/26/2013    Arthroscopy Knee Left    LUMBAR FUSION  06/26/2013    Lumbar Vertebral Fusion    TOTAL KNEE ARTHROPLASTY  06/26/2013    Knee Replacement

## 2025-05-24 LAB — BACTERIA UR CULT: NORMAL

## 2025-07-17 ENCOUNTER — LAB REQUISITION (OUTPATIENT)
Dept: LAB | Facility: HOSPITAL | Age: 70
End: 2025-07-17
Payer: MEDICARE

## 2025-07-17 DIAGNOSIS — R53.1 WEAKNESS: ICD-10-CM

## 2025-07-17 LAB
ANION GAP SERPL CALC-SCNC: 11 MMOL/L (ref 10–20)
BUN SERPL-MCNC: 20 MG/DL (ref 6–23)
CALCIUM SERPL-MCNC: 8.5 MG/DL (ref 8.6–10.3)
CHLORIDE SERPL-SCNC: 103 MMOL/L (ref 98–107)
CO2 SERPL-SCNC: 30 MMOL/L (ref 21–32)
CREAT SERPL-MCNC: 0.51 MG/DL (ref 0.5–1.3)
EGFRCR SERPLBLD CKD-EPI 2021: >90 ML/MIN/1.73M*2
ERYTHROCYTE [DISTWIDTH] IN BLOOD BY AUTOMATED COUNT: 13.3 % (ref 11.5–14.5)
GLUCOSE SERPL-MCNC: 103 MG/DL (ref 74–99)
HCT VFR BLD AUTO: 42.7 % (ref 41–52)
HGB BLD-MCNC: 13.8 G/DL (ref 13.5–17.5)
MCH RBC QN AUTO: 31.2 PG (ref 26–34)
MCHC RBC AUTO-ENTMCNC: 32.3 G/DL (ref 32–36)
MCV RBC AUTO: 96 FL (ref 80–100)
NRBC BLD-RTO: 0 /100 WBCS (ref 0–0)
PLATELET # BLD AUTO: 262 X10*3/UL (ref 150–450)
POTASSIUM SERPL-SCNC: 4 MMOL/L (ref 3.5–5.3)
RBC # BLD AUTO: 4.43 X10*6/UL (ref 4.5–5.9)
SODIUM SERPL-SCNC: 140 MMOL/L (ref 136–145)
WBC # BLD AUTO: 14.3 X10*3/UL (ref 4.4–11.3)

## 2025-07-17 PROCEDURE — 36415 COLL VENOUS BLD VENIPUNCTURE: CPT | Mod: OUT | Performed by: INTERNAL MEDICINE

## 2025-07-17 PROCEDURE — 85027 COMPLETE CBC AUTOMATED: CPT | Mod: OUT | Performed by: INTERNAL MEDICINE

## 2025-07-17 PROCEDURE — 80048 BASIC METABOLIC PNL TOTAL CA: CPT | Mod: OUT | Performed by: INTERNAL MEDICINE

## 2025-08-05 ENCOUNTER — LAB REQUISITION (OUTPATIENT)
Dept: LAB | Facility: HOSPITAL | Age: 70
End: 2025-08-05
Payer: MEDICARE

## 2025-08-05 DIAGNOSIS — R53.1 WEAKNESS: ICD-10-CM

## 2025-08-05 LAB
ANION GAP SERPL CALC-SCNC: 8 MMOL/L (ref 10–20)
BUN SERPL-MCNC: 17 MG/DL (ref 6–23)
CALCIUM SERPL-MCNC: 9 MG/DL (ref 8.6–10.3)
CHLORIDE SERPL-SCNC: 106 MMOL/L (ref 98–107)
CO2 SERPL-SCNC: 33 MMOL/L (ref 21–32)
CREAT SERPL-MCNC: 0.73 MG/DL (ref 0.5–1.3)
EGFRCR SERPLBLD CKD-EPI 2021: >90 ML/MIN/1.73M*2
ERYTHROCYTE [DISTWIDTH] IN BLOOD BY AUTOMATED COUNT: 12.9 % (ref 11.5–14.5)
GLUCOSE SERPL-MCNC: 97 MG/DL (ref 74–99)
HCT VFR BLD AUTO: 42 % (ref 41–52)
HGB BLD-MCNC: 12.9 G/DL (ref 13.5–17.5)
MCH RBC QN AUTO: 30 PG (ref 26–34)
MCHC RBC AUTO-ENTMCNC: 30.7 G/DL (ref 32–36)
MCV RBC AUTO: 98 FL (ref 80–100)
NRBC BLD-RTO: 0 /100 WBCS (ref 0–0)
PLATELET # BLD AUTO: 318 X10*3/UL (ref 150–450)
POTASSIUM SERPL-SCNC: 3.8 MMOL/L (ref 3.5–5.3)
RBC # BLD AUTO: 4.3 X10*6/UL (ref 4.5–5.9)
SODIUM SERPL-SCNC: 143 MMOL/L (ref 136–145)
WBC # BLD AUTO: 6.9 X10*3/UL (ref 4.4–11.3)

## 2025-08-05 PROCEDURE — 36415 COLL VENOUS BLD VENIPUNCTURE: CPT | Mod: OUT | Performed by: INTERNAL MEDICINE

## 2025-08-05 PROCEDURE — 82374 ASSAY BLOOD CARBON DIOXIDE: CPT | Mod: OUT | Performed by: INTERNAL MEDICINE

## 2025-08-05 PROCEDURE — 85027 COMPLETE CBC AUTOMATED: CPT | Mod: OUT | Performed by: INTERNAL MEDICINE

## 2025-08-14 ENCOUNTER — OFFICE VISIT (OUTPATIENT)
Dept: URGENT CARE | Age: 70
End: 2025-08-14
Payer: MEDICARE

## 2025-08-14 ENCOUNTER — ANCILLARY PROCEDURE (OUTPATIENT)
Dept: URGENT CARE | Age: 70
End: 2025-08-14
Payer: MEDICARE

## 2025-08-14 VITALS
OXYGEN SATURATION: 99 % | DIASTOLIC BLOOD PRESSURE: 72 MMHG | HEART RATE: 84 BPM | TEMPERATURE: 98 F | RESPIRATION RATE: 16 BRPM | SYSTOLIC BLOOD PRESSURE: 112 MMHG

## 2025-08-14 DIAGNOSIS — T14.8XXA AVULSION FRACTURE: Primary | ICD-10-CM

## 2025-08-14 DIAGNOSIS — S59.902A ELBOW INJURY, LEFT, INITIAL ENCOUNTER: ICD-10-CM

## 2025-08-14 DIAGNOSIS — T14.8XXA INFECTED WOUND: ICD-10-CM

## 2025-08-14 DIAGNOSIS — L08.9 INFECTED WOUND: ICD-10-CM

## 2025-08-14 PROCEDURE — 73080 X-RAY EXAM OF ELBOW: CPT | Mod: LEFT SIDE | Performed by: PHYSICIAN ASSISTANT

## 2025-08-14 RX ORDER — AMLODIPINE BESYLATE 10 MG/1
1 TABLET ORAL
COMMUNITY
Start: 2025-07-07

## 2025-08-14 RX ORDER — DOXYCYCLINE HYCLATE 100 MG
100 TABLET ORAL 2 TIMES DAILY
Qty: 14 TABLET | Refills: 0 | Status: SHIPPED | OUTPATIENT
Start: 2025-08-14 | End: 2025-08-21

## 2025-08-14 RX ORDER — ERGOCALCIFEROL 1.25 MG/1
1.25 CAPSULE ORAL
COMMUNITY
Start: 2025-06-18

## 2025-08-14 RX ORDER — DOCUSATE SODIUM 100 MG/1
100 CAPSULE, LIQUID FILLED ORAL 2 TIMES DAILY
COMMUNITY
Start: 2025-07-10

## 2025-08-14 RX ORDER — ASPIRIN 81 MG/1
81 TABLET ORAL
COMMUNITY

## 2025-08-14 RX ORDER — LANOLIN ALCOHOL/MO/W.PET/CERES
25 CREAM (GRAM) TOPICAL 2 TIMES DAILY
COMMUNITY
Start: 2025-08-04

## 2025-08-14 RX ORDER — GUAIFENESIN 600 MG/1
600 TABLET, EXTENDED RELEASE ORAL 2 TIMES DAILY
COMMUNITY

## 2025-08-14 RX ORDER — SCOPOLAMINE 1 MG/3D
1 PATCH, EXTENDED RELEASE TRANSDERMAL
COMMUNITY

## 2025-08-14 RX ORDER — IPRATROPIUM BROMIDE AND ALBUTEROL SULFATE 2.5; .5 MG/3ML; MG/3ML
3 SOLUTION RESPIRATORY (INHALATION)
COMMUNITY

## 2025-08-14 RX ORDER — OMEPRAZOLE 20 MG/1
CAPSULE, DELAYED RELEASE ORAL
COMMUNITY
Start: 2025-03-16

## 2025-08-17 ENCOUNTER — APPOINTMENT (OUTPATIENT)
Dept: CARDIOLOGY | Facility: HOSPITAL | Age: 70
DRG: 603 | End: 2025-08-17
Payer: MEDICARE

## 2025-08-17 ENCOUNTER — DOCUMENTATION (OUTPATIENT)
Dept: URGENT CARE | Age: 70
End: 2025-08-17

## 2025-08-17 ENCOUNTER — HOSPITAL ENCOUNTER (INPATIENT)
Facility: HOSPITAL | Age: 70
LOS: 3 days | Discharge: HOME | DRG: 603 | End: 2025-08-20
Attending: STUDENT IN AN ORGANIZED HEALTH CARE EDUCATION/TRAINING PROGRAM | Admitting: INTERNAL MEDICINE
Payer: MEDICARE

## 2025-08-17 DIAGNOSIS — L03.119 CELLULITIS OF LOWER EXTREMITY, UNSPECIFIED LATERALITY: Primary | ICD-10-CM

## 2025-08-17 DIAGNOSIS — L03.114 CELLULITIS OF LEFT UPPER EXTREMITY: Primary | ICD-10-CM

## 2025-08-17 DIAGNOSIS — M79.89 LEG SWELLING: ICD-10-CM

## 2025-08-17 LAB
ALBUMIN SERPL BCP-MCNC: 3.8 G/DL (ref 3.4–5)
ALP SERPL-CCNC: 100 U/L (ref 33–136)
ALT SERPL W P-5'-P-CCNC: 20 U/L (ref 10–52)
ANION GAP SERPL CALC-SCNC: 10 MMOL/L (ref 10–20)
AST SERPL W P-5'-P-CCNC: 16 U/L (ref 9–39)
BACTERIA SPEC AEROBE CULT: ABNORMAL
BACTERIA SPEC ANAEROBE CULT: ABNORMAL
BASOPHILS # BLD AUTO: 0.07 X10*3/UL (ref 0–0.1)
BASOPHILS NFR BLD AUTO: 0.7 %
BILIRUB SERPL-MCNC: 0.2 MG/DL (ref 0–1.2)
BNP SERPL-MCNC: 19 PG/ML (ref 0–99)
BUN SERPL-MCNC: 25 MG/DL (ref 6–23)
CALCIUM SERPL-MCNC: 9.3 MG/DL (ref 8.6–10.3)
CARDIAC TROPONIN I PNL SERPL HS: 6 NG/L (ref 0–20)
CHLORIDE SERPL-SCNC: 107 MMOL/L (ref 98–107)
CO2 SERPL-SCNC: 30 MMOL/L (ref 21–32)
CREAT SERPL-MCNC: 0.68 MG/DL (ref 0.5–1.3)
EGFRCR SERPLBLD CKD-EPI 2021: >90 ML/MIN/1.73M*2
EOSINOPHIL # BLD AUTO: 0.16 X10*3/UL (ref 0–0.7)
EOSINOPHIL NFR BLD AUTO: 1.5 %
ERYTHROCYTE [DISTWIDTH] IN BLOOD BY AUTOMATED COUNT: 13.2 % (ref 11.5–14.5)
GLUCOSE SERPL-MCNC: 87 MG/DL (ref 74–99)
HCT VFR BLD AUTO: 39.9 % (ref 41–52)
HGB BLD-MCNC: 12.4 G/DL (ref 13.5–17.5)
IMM GRANULOCYTES # BLD AUTO: 0.2 X10*3/UL (ref 0–0.7)
IMM GRANULOCYTES NFR BLD AUTO: 1.9 % (ref 0–0.9)
LACTATE SERPL-SCNC: 1.3 MMOL/L (ref 0.4–2)
LYMPHOCYTES # BLD AUTO: 2.14 X10*3/UL (ref 1.2–4.8)
LYMPHOCYTES NFR BLD AUTO: 19.9 %
MAGNESIUM SERPL-MCNC: 1.74 MG/DL (ref 1.6–2.4)
MCH RBC QN AUTO: 30 PG (ref 26–34)
MCHC RBC AUTO-ENTMCNC: 31.1 G/DL (ref 32–36)
MCV RBC AUTO: 97 FL (ref 80–100)
MONOCYTES # BLD AUTO: 0.98 X10*3/UL (ref 0.1–1)
MONOCYTES NFR BLD AUTO: 9.1 %
NEUTROPHILS # BLD AUTO: 7.18 X10*3/UL (ref 1.2–7.7)
NEUTROPHILS NFR BLD AUTO: 66.9 %
NRBC BLD-RTO: 0 /100 WBCS (ref 0–0)
PLATELET # BLD AUTO: 408 X10*3/UL (ref 150–450)
POTASSIUM SERPL-SCNC: 3.6 MMOL/L (ref 3.5–5.3)
PROT SERPL-MCNC: 6.9 G/DL (ref 6.4–8.2)
RBC # BLD AUTO: 4.13 X10*6/UL (ref 4.5–5.9)
SODIUM SERPL-SCNC: 143 MMOL/L (ref 136–145)
WBC # BLD AUTO: 10.7 X10*3/UL (ref 4.4–11.3)

## 2025-08-17 PROCEDURE — 93005 ELECTROCARDIOGRAM TRACING: CPT

## 2025-08-17 PROCEDURE — 1210000001 HC SEMI-PRIVATE ROOM DAILY

## 2025-08-17 PROCEDURE — 80053 COMPREHEN METABOLIC PANEL: CPT | Performed by: STUDENT IN AN ORGANIZED HEALTH CARE EDUCATION/TRAINING PROGRAM

## 2025-08-17 PROCEDURE — 83605 ASSAY OF LACTIC ACID: CPT | Performed by: STUDENT IN AN ORGANIZED HEALTH CARE EDUCATION/TRAINING PROGRAM

## 2025-08-17 PROCEDURE — 83735 ASSAY OF MAGNESIUM: CPT | Performed by: STUDENT IN AN ORGANIZED HEALTH CARE EDUCATION/TRAINING PROGRAM

## 2025-08-17 PROCEDURE — 84484 ASSAY OF TROPONIN QUANT: CPT | Performed by: STUDENT IN AN ORGANIZED HEALTH CARE EDUCATION/TRAINING PROGRAM

## 2025-08-17 PROCEDURE — 36415 COLL VENOUS BLD VENIPUNCTURE: CPT | Performed by: STUDENT IN AN ORGANIZED HEALTH CARE EDUCATION/TRAINING PROGRAM

## 2025-08-17 PROCEDURE — 87075 CULTR BACTERIA EXCEPT BLOOD: CPT | Mod: PARLAB | Performed by: STUDENT IN AN ORGANIZED HEALTH CARE EDUCATION/TRAINING PROGRAM

## 2025-08-17 PROCEDURE — 99285 EMERGENCY DEPT VISIT HI MDM: CPT | Performed by: STUDENT IN AN ORGANIZED HEALTH CARE EDUCATION/TRAINING PROGRAM

## 2025-08-17 PROCEDURE — 83880 ASSAY OF NATRIURETIC PEPTIDE: CPT | Performed by: STUDENT IN AN ORGANIZED HEALTH CARE EDUCATION/TRAINING PROGRAM

## 2025-08-17 PROCEDURE — 85025 COMPLETE CBC W/AUTO DIFF WBC: CPT | Performed by: STUDENT IN AN ORGANIZED HEALTH CARE EDUCATION/TRAINING PROGRAM

## 2025-08-17 PROCEDURE — 2500000005 HC RX 250 GENERAL PHARMACY W/O HCPCS: Performed by: STUDENT IN AN ORGANIZED HEALTH CARE EDUCATION/TRAINING PROGRAM

## 2025-08-17 PROCEDURE — 2500000004 HC RX 250 GENERAL PHARMACY W/ HCPCS (ALT 636 FOR OP/ED): Performed by: STUDENT IN AN ORGANIZED HEALTH CARE EDUCATION/TRAINING PROGRAM

## 2025-08-17 RX ORDER — VANCOMYCIN HYDROCHLORIDE 1 G/20ML
INJECTION, POWDER, LYOPHILIZED, FOR SOLUTION INTRAVENOUS DAILY PRN
Status: DISCONTINUED | OUTPATIENT
Start: 2025-08-17 | End: 2025-08-17

## 2025-08-17 RX ADMIN — WATER 2000 MG: 1 INJECTION INTRAMUSCULAR; INTRAVENOUS; SUBCUTANEOUS at 23:23

## 2025-08-17 ASSESSMENT — PAIN DESCRIPTION - PAIN TYPE: TYPE: ACUTE PAIN

## 2025-08-17 ASSESSMENT — PAIN SCALES - GENERAL: PAINLEVEL_OUTOF10: 8

## 2025-08-17 ASSESSMENT — PAIN - FUNCTIONAL ASSESSMENT: PAIN_FUNCTIONAL_ASSESSMENT: 0-10

## 2025-08-18 PROCEDURE — 2500000001 HC RX 250 WO HCPCS SELF ADMINISTERED DRUGS (ALT 637 FOR MEDICARE OP): Performed by: INTERNAL MEDICINE

## 2025-08-18 PROCEDURE — 97161 PT EVAL LOW COMPLEX 20 MIN: CPT | Mod: GP

## 2025-08-18 PROCEDURE — 2500000005 HC RX 250 GENERAL PHARMACY W/O HCPCS: Performed by: INTERNAL MEDICINE

## 2025-08-18 PROCEDURE — 97165 OT EVAL LOW COMPLEX 30 MIN: CPT | Mod: GO

## 2025-08-18 PROCEDURE — 2500000004 HC RX 250 GENERAL PHARMACY W/ HCPCS (ALT 636 FOR OP/ED): Performed by: NURSE PRACTITIONER

## 2025-08-18 PROCEDURE — 1100000001 HC PRIVATE ROOM DAILY

## 2025-08-18 PROCEDURE — 2500000004 HC RX 250 GENERAL PHARMACY W/ HCPCS (ALT 636 FOR OP/ED): Performed by: INTERNAL MEDICINE

## 2025-08-18 PROCEDURE — 2500000002 HC RX 250 W HCPCS SELF ADMINISTERED DRUGS (ALT 637 FOR MEDICARE OP, ALT 636 FOR OP/ED): Performed by: INTERNAL MEDICINE

## 2025-08-18 RX ORDER — HYDROMORPHONE HYDROCHLORIDE 1 MG/ML
1 INJECTION, SOLUTION INTRAMUSCULAR; INTRAVENOUS; SUBCUTANEOUS EVERY 4 HOURS PRN
Status: DISCONTINUED | OUTPATIENT
Start: 2025-08-18 | End: 2025-08-20

## 2025-08-18 RX ORDER — BACLOFEN 10 MG/1
20 TABLET ORAL NIGHTLY
Status: DISCONTINUED | OUTPATIENT
Start: 2025-08-18 | End: 2025-08-20 | Stop reason: HOSPADM

## 2025-08-18 RX ORDER — FLUOXETINE 20 MG/1
20 CAPSULE ORAL DAILY
Status: DISCONTINUED | OUTPATIENT
Start: 2025-08-18 | End: 2025-08-20

## 2025-08-18 RX ORDER — GABAPENTIN 600 MG/1
600 TABLET ORAL 4 TIMES DAILY
COMMUNITY

## 2025-08-18 RX ORDER — ASPIRIN 81 MG/1
81 TABLET ORAL DAILY
Status: DISCONTINUED | OUTPATIENT
Start: 2025-08-18 | End: 2025-08-20 | Stop reason: HOSPADM

## 2025-08-18 RX ORDER — GUAIFENESIN 600 MG/1
600 TABLET, EXTENDED RELEASE ORAL DAILY
COMMUNITY
End: 2025-08-18 | Stop reason: ENTERED-IN-ERROR

## 2025-08-18 RX ORDER — ONDANSETRON 4 MG/1
4 TABLET, FILM COATED ORAL EVERY 8 HOURS PRN
Status: DISCONTINUED | OUTPATIENT
Start: 2025-08-18 | End: 2025-08-20 | Stop reason: HOSPADM

## 2025-08-18 RX ORDER — VANCOMYCIN HYDROCHLORIDE 1 G/20ML
INJECTION, POWDER, LYOPHILIZED, FOR SOLUTION INTRAVENOUS DAILY PRN
Status: DISCONTINUED | OUTPATIENT
Start: 2025-08-18 | End: 2025-08-20

## 2025-08-18 RX ORDER — AMLODIPINE BESYLATE 10 MG/1
10 TABLET ORAL DAILY
Status: DISCONTINUED | OUTPATIENT
Start: 2025-08-18 | End: 2025-08-19

## 2025-08-18 RX ORDER — METHOCARBAMOL 500 MG/1
750 TABLET, FILM COATED ORAL EVERY 8 HOURS SCHEDULED
Status: DISCONTINUED | OUTPATIENT
Start: 2025-08-18 | End: 2025-08-18

## 2025-08-18 RX ORDER — METHOCARBAMOL 750 MG/1
750 TABLET, FILM COATED ORAL EVERY MORNING
Status: DISCONTINUED | OUTPATIENT
Start: 2025-08-19 | End: 2025-08-20 | Stop reason: HOSPADM

## 2025-08-18 RX ORDER — GABAPENTIN 300 MG/1
600 CAPSULE ORAL DAILY
Status: DISCONTINUED | OUTPATIENT
Start: 2025-08-18 | End: 2025-08-20 | Stop reason: HOSPADM

## 2025-08-18 RX ORDER — DOXYCYCLINE 100 MG/1
100 TABLET ORAL 2 TIMES DAILY
COMMUNITY
Start: 2025-08-14 | End: 2025-08-20 | Stop reason: HOSPADM

## 2025-08-18 RX ORDER — MELOXICAM 15 MG/1
15 TABLET ORAL
Status: DISCONTINUED | OUTPATIENT
Start: 2025-08-18 | End: 2025-08-20 | Stop reason: HOSPADM

## 2025-08-18 RX ORDER — TRAZODONE HYDROCHLORIDE 50 MG/1
50 TABLET ORAL NIGHTLY PRN
COMMUNITY
End: 2025-08-18 | Stop reason: ENTERED-IN-ERROR

## 2025-08-18 RX ORDER — ONDANSETRON HYDROCHLORIDE 2 MG/ML
4 INJECTION, SOLUTION INTRAVENOUS EVERY 8 HOURS PRN
Status: DISCONTINUED | OUTPATIENT
Start: 2025-08-18 | End: 2025-08-20

## 2025-08-18 RX ORDER — LOSARTAN POTASSIUM 50 MG/1
100 TABLET ORAL DAILY
Status: DISCONTINUED | OUTPATIENT
Start: 2025-08-18 | End: 2025-08-20 | Stop reason: HOSPADM

## 2025-08-18 RX ORDER — PANTOPRAZOLE SODIUM 40 MG/1
40 TABLET, DELAYED RELEASE ORAL DAILY
Status: DISCONTINUED | OUTPATIENT
Start: 2025-08-18 | End: 2025-08-20 | Stop reason: HOSPADM

## 2025-08-18 RX ORDER — BACLOFEN 20 MG/1
20 TABLET ORAL NIGHTLY
COMMUNITY

## 2025-08-18 RX ORDER — ACETAMINOPHEN 160 MG/5ML
650 SOLUTION ORAL EVERY 4 HOURS PRN
Status: DISCONTINUED | OUTPATIENT
Start: 2025-08-18 | End: 2025-08-20 | Stop reason: HOSPADM

## 2025-08-18 RX ORDER — MORPHINE SULFATE 2 MG/ML
2 INJECTION, SOLUTION INTRAMUSCULAR; INTRAVENOUS ONCE
Status: COMPLETED | OUTPATIENT
Start: 2025-08-18 | End: 2025-08-18

## 2025-08-18 RX ORDER — VANCOMYCIN HYDROCHLORIDE 1.25 G/250ML
1250 INJECTION, SOLUTION INTRAVITREAL EVERY 12 HOURS
Status: DISCONTINUED | OUTPATIENT
Start: 2025-08-18 | End: 2025-08-20

## 2025-08-18 RX ORDER — HYDROCODONE BITARTRATE AND ACETAMINOPHEN 5; 325 MG/1; MG/1
1 TABLET ORAL EVERY 6 HOURS PRN
Refills: 0 | Status: DISCONTINUED | OUTPATIENT
Start: 2025-08-18 | End: 2025-08-18

## 2025-08-18 RX ORDER — SIMVASTATIN 20 MG/1
40 TABLET, FILM COATED ORAL DAILY
Status: DISCONTINUED | OUTPATIENT
Start: 2025-08-18 | End: 2025-08-20 | Stop reason: HOSPADM

## 2025-08-18 RX ORDER — BACLOFEN 10 MG/1
10 TABLET ORAL NIGHTLY
COMMUNITY
End: 2025-08-18 | Stop reason: ENTERED-IN-ERROR

## 2025-08-18 RX ORDER — CARISOPRODOL 350 MG/1
350 TABLET ORAL NIGHTLY
Status: DISCONTINUED | OUTPATIENT
Start: 2025-08-18 | End: 2025-08-18

## 2025-08-18 RX ORDER — ENOXAPARIN SODIUM 100 MG/ML
40 INJECTION SUBCUTANEOUS EVERY 24 HOURS
Status: DISCONTINUED | OUTPATIENT
Start: 2025-08-18 | End: 2025-08-20 | Stop reason: HOSPADM

## 2025-08-18 RX ORDER — ALLOPURINOL 100 MG/1
100 TABLET ORAL DAILY
Status: DISCONTINUED | OUTPATIENT
Start: 2025-08-18 | End: 2025-08-20 | Stop reason: HOSPADM

## 2025-08-18 RX ORDER — HYDROCHLOROTHIAZIDE 12.5 MG/1
12.5 TABLET ORAL DAILY
Status: DISCONTINUED | OUTPATIENT
Start: 2025-08-18 | End: 2025-08-20 | Stop reason: HOSPADM

## 2025-08-18 RX ORDER — ESOMEPRAZOLE MAGNESIUM 40 MG/1
40 GRANULE, DELAYED RELEASE ORAL
Status: DISCONTINUED | OUTPATIENT
Start: 2025-08-18 | End: 2025-08-18

## 2025-08-18 RX ORDER — ACETAMINOPHEN 325 MG/1
650 TABLET ORAL EVERY 4 HOURS PRN
Status: DISCONTINUED | OUTPATIENT
Start: 2025-08-18 | End: 2025-08-20 | Stop reason: HOSPADM

## 2025-08-18 RX ORDER — METHOCARBAMOL 750 MG/1
750 TABLET, FILM COATED ORAL EVERY MORNING
COMMUNITY
End: 2025-08-20 | Stop reason: HOSPADM

## 2025-08-18 RX ORDER — TALC
3 POWDER (GRAM) TOPICAL NIGHTLY PRN
Status: DISCONTINUED | OUTPATIENT
Start: 2025-08-18 | End: 2025-08-20 | Stop reason: HOSPADM

## 2025-08-18 RX ORDER — LOPERAMIDE HYDROCHLORIDE 2 MG/1
4 CAPSULE ORAL DAILY
Status: DISCONTINUED | OUTPATIENT
Start: 2025-08-18 | End: 2025-08-20 | Stop reason: HOSPADM

## 2025-08-18 RX ORDER — ASCORBIC ACID 500 MG
500 TABLET ORAL DAILY
COMMUNITY

## 2025-08-18 RX ORDER — ERGOCALCIFEROL 1.25 MG/1
1.25 CAPSULE ORAL WEEKLY
COMMUNITY

## 2025-08-18 RX ORDER — OXYCODONE HYDROCHLORIDE 5 MG/1
5 TABLET ORAL EVERY 6 HOURS PRN
COMMUNITY
Start: 2025-08-08 | End: 2025-08-20 | Stop reason: HOSPADM

## 2025-08-18 RX ORDER — FUROSEMIDE 10 MG/ML
20 INJECTION INTRAMUSCULAR; INTRAVENOUS EVERY 24 HOURS
Status: DISCONTINUED | OUTPATIENT
Start: 2025-08-18 | End: 2025-08-19

## 2025-08-18 RX ORDER — OXYCODONE AND ACETAMINOPHEN 5; 325 MG/1; MG/1
1 TABLET ORAL EVERY 6 HOURS PRN
Status: DISCONTINUED | OUTPATIENT
Start: 2025-08-18 | End: 2025-08-20

## 2025-08-18 RX ORDER — ZINC GLUCONATE 50 MG
50 TABLET ORAL DAILY
COMMUNITY

## 2025-08-18 RX ORDER — ACETAMINOPHEN 650 MG/1
650 SUPPOSITORY RECTAL EVERY 4 HOURS PRN
Status: DISCONTINUED | OUTPATIENT
Start: 2025-08-18 | End: 2025-08-20 | Stop reason: HOSPADM

## 2025-08-18 RX ORDER — TAMSULOSIN HYDROCHLORIDE 0.4 MG/1
0.4 CAPSULE ORAL NIGHTLY
Status: DISCONTINUED | OUTPATIENT
Start: 2025-08-18 | End: 2025-08-20 | Stop reason: HOSPADM

## 2025-08-18 RX ORDER — BUPROPION HYDROCHLORIDE 100 MG/1
100 TABLET ORAL DAILY
Status: DISCONTINUED | OUTPATIENT
Start: 2025-08-18 | End: 2025-08-20

## 2025-08-18 RX ADMIN — HYDROMORPHONE HYDROCHLORIDE 1 MG: 1 INJECTION, SOLUTION INTRAMUSCULAR; INTRAVENOUS; SUBCUTANEOUS at 18:07

## 2025-08-18 RX ADMIN — VANCOMYCIN HYDROCHLORIDE 1250 MG: 1.25 INJECTION, SOLUTION INTRAVITREAL at 13:17

## 2025-08-18 RX ADMIN — HYDROCODONE BITARTRATE AND ACETAMINOPHEN 1 TABLET: 5; 325 TABLET ORAL at 01:46

## 2025-08-18 RX ADMIN — MORPHINE SULFATE 2 MG: 2 INJECTION, SOLUTION INTRAMUSCULAR; INTRAVENOUS at 08:49

## 2025-08-18 RX ADMIN — OXYCODONE HYDROCHLORIDE AND ACETAMINOPHEN 1 TABLET: 5; 325 TABLET ORAL at 21:15

## 2025-08-18 RX ADMIN — FUROSEMIDE 20 MG: 10 INJECTION, SOLUTION INTRAMUSCULAR; INTRAVENOUS at 08:48

## 2025-08-18 RX ADMIN — ENOXAPARIN SODIUM 40 MG: 100 INJECTION SUBCUTANEOUS at 08:48

## 2025-08-18 RX ADMIN — VANCOMYCIN HYDROCHLORIDE 1250 MG: 1.25 INJECTION, SOLUTION INTRAVITREAL at 22:09

## 2025-08-18 RX ADMIN — AMLODIPINE BESYLATE 10 MG: 10 TABLET ORAL at 08:48

## 2025-08-18 RX ADMIN — GABAPENTIN 600 MG: 300 CAPSULE ORAL at 08:48

## 2025-08-18 RX ADMIN — TAMSULOSIN HYDROCHLORIDE 0.4 MG: 0.4 CAPSULE ORAL at 21:14

## 2025-08-18 RX ADMIN — LOSARTAN POTASSIUM 100 MG: 50 TABLET, FILM COATED ORAL at 08:49

## 2025-08-18 RX ADMIN — MELOXICAM 15 MG: 15 TABLET ORAL at 12:53

## 2025-08-18 RX ADMIN — PANTOPRAZOLE SODIUM 40 MG: 40 TABLET, DELAYED RELEASE ORAL at 08:48

## 2025-08-18 RX ADMIN — FLUOXETINE HYDROCHLORIDE 20 MG: 20 CAPSULE ORAL at 12:53

## 2025-08-18 RX ADMIN — LOPERAMIDE HYDROCHLORIDE 4 MG: 2 CAPSULE ORAL at 08:48

## 2025-08-18 RX ADMIN — BACLOFEN 20 MG: 10 TABLET ORAL at 21:14

## 2025-08-18 RX ADMIN — Medication 3 MG: at 21:15

## 2025-08-18 RX ADMIN — SIMVASTATIN 40 MG: 20 TABLET, FILM COATED ORAL at 12:54

## 2025-08-18 RX ADMIN — OXYCODONE HYDROCHLORIDE AND ACETAMINOPHEN 1 TABLET: 5; 325 TABLET ORAL at 12:54

## 2025-08-18 SDOH — SOCIAL STABILITY: SOCIAL INSECURITY: WITHIN THE LAST YEAR, HAVE YOU BEEN AFRAID OF YOUR PARTNER OR EX-PARTNER?: NO

## 2025-08-18 SDOH — SOCIAL STABILITY: SOCIAL INSECURITY: ARE THERE ANY APPARENT SIGNS OF INJURIES/BEHAVIORS THAT COULD BE RELATED TO ABUSE/NEGLECT?: NO

## 2025-08-18 SDOH — SOCIAL STABILITY: SOCIAL INSECURITY: WITHIN THE LAST YEAR, HAVE YOU BEEN HUMILIATED OR EMOTIONALLY ABUSED IN OTHER WAYS BY YOUR PARTNER OR EX-PARTNER?: NO

## 2025-08-18 SDOH — SOCIAL STABILITY: SOCIAL INSECURITY: WERE YOU ABLE TO COMPLETE ALL THE BEHAVIORAL HEALTH SCREENINGS?: YES

## 2025-08-18 SDOH — SOCIAL STABILITY: SOCIAL INSECURITY: ABUSE: ADULT

## 2025-08-18 SDOH — ECONOMIC STABILITY: FOOD INSECURITY: WITHIN THE PAST 12 MONTHS, THE FOOD YOU BOUGHT JUST DIDN'T LAST AND YOU DIDN'T HAVE MONEY TO GET MORE.: NEVER TRUE

## 2025-08-18 SDOH — ECONOMIC STABILITY: INCOME INSECURITY: IN THE PAST 12 MONTHS HAS THE ELECTRIC, GAS, OIL, OR WATER COMPANY THREATENED TO SHUT OFF SERVICES IN YOUR HOME?: NO

## 2025-08-18 SDOH — ECONOMIC STABILITY: FOOD INSECURITY: WITHIN THE PAST 12 MONTHS, YOU WORRIED THAT YOUR FOOD WOULD RUN OUT BEFORE YOU GOT THE MONEY TO BUY MORE.: NEVER TRUE

## 2025-08-18 SDOH — SOCIAL STABILITY: SOCIAL INSECURITY: DO YOU FEEL ANYONE HAS EXPLOITED OR TAKEN ADVANTAGE OF YOU FINANCIALLY OR OF YOUR PERSONAL PROPERTY?: NO

## 2025-08-18 SDOH — SOCIAL STABILITY: SOCIAL INSECURITY: HAVE YOU HAD ANY THOUGHTS OF HARMING ANYONE ELSE?: NO

## 2025-08-18 SDOH — SOCIAL STABILITY: SOCIAL INSECURITY: DO YOU FEEL UNSAFE GOING BACK TO THE PLACE WHERE YOU ARE LIVING?: NO

## 2025-08-18 SDOH — SOCIAL STABILITY: SOCIAL INSECURITY: ARE YOU OR HAVE YOU BEEN THREATENED OR ABUSED PHYSICALLY, EMOTIONALLY, OR SEXUALLY BY ANYONE?: NO

## 2025-08-18 SDOH — SOCIAL STABILITY: SOCIAL INSECURITY: HAS ANYONE EVER THREATENED TO HURT YOUR FAMILY OR YOUR PETS?: NO

## 2025-08-18 SDOH — SOCIAL STABILITY: SOCIAL INSECURITY: DOES ANYONE TRY TO KEEP YOU FROM HAVING/CONTACTING OTHER FRIENDS OR DOING THINGS OUTSIDE YOUR HOME?: NO

## 2025-08-18 SDOH — SOCIAL STABILITY: SOCIAL INSECURITY: HAVE YOU HAD THOUGHTS OF HARMING ANYONE ELSE?: NO

## 2025-08-18 ASSESSMENT — PAIN - FUNCTIONAL ASSESSMENT
PAIN_FUNCTIONAL_ASSESSMENT: 0-10

## 2025-08-18 ASSESSMENT — COGNITIVE AND FUNCTIONAL STATUS - GENERAL
DRESSING REGULAR UPPER BODY CLOTHING: A LITTLE
PERSONAL GROOMING: A LITTLE
STANDING UP FROM CHAIR USING ARMS: A LITTLE
TURNING FROM BACK TO SIDE WHILE IN FLAT BAD: A LITTLE
TURNING FROM BACK TO SIDE WHILE IN FLAT BAD: A LITTLE
HELP NEEDED FOR BATHING: A LITTLE
DRESSING REGULAR LOWER BODY CLOTHING: A LITTLE
PERSONAL GROOMING: A LITTLE
CLIMB 3 TO 5 STEPS WITH RAILING: A LOT
MOVING TO AND FROM BED TO CHAIR: A LITTLE
TOILETING: A LITTLE
STANDING UP FROM CHAIR USING ARMS: A LITTLE
EATING MEALS: A LITTLE
MOVING FROM LYING ON BACK TO SITTING ON SIDE OF FLAT BED WITH BEDRAILS: A LITTLE
DAILY ACTIVITIY SCORE: 18
TURNING FROM BACK TO SIDE WHILE IN FLAT BAD: A LITTLE
DRESSING REGULAR UPPER BODY CLOTHING: A LOT
HELP NEEDED FOR BATHING: A LITTLE
MOBILITY SCORE: 16
DAILY ACTIVITIY SCORE: 18
STANDING UP FROM CHAIR USING ARMS: A LITTLE
MOBILITY SCORE: 17
WALKING IN HOSPITAL ROOM: A LOT
HELP NEEDED FOR BATHING: A LOT
MOVING TO AND FROM BED TO CHAIR: A LITTLE
MOVING FROM LYING ON BACK TO SITTING ON SIDE OF FLAT BED WITH BEDRAILS: A LITTLE
DRESSING REGULAR LOWER BODY CLOTHING: A LITTLE
TOILETING: A LOT
PATIENT BASELINE BEDBOUND: NO
WALKING IN HOSPITAL ROOM: A LITTLE
MOBILITY SCORE: 16
DRESSING REGULAR UPPER BODY CLOTHING: A LITTLE
DAILY ACTIVITIY SCORE: 14
CLIMB 3 TO 5 STEPS WITH RAILING: A LOT
MOVING TO AND FROM BED TO CHAIR: A LITTLE
MOVING FROM LYING ON BACK TO SITTING ON SIDE OF FLAT BED WITH BEDRAILS: A LITTLE
CLIMB 3 TO 5 STEPS WITH RAILING: A LOT
EATING MEALS: A LITTLE
PERSONAL GROOMING: A LITTLE
WALKING IN HOSPITAL ROOM: A LOT
TOILETING: A LITTLE
DRESSING REGULAR LOWER BODY CLOTHING: TOTAL

## 2025-08-18 ASSESSMENT — ENCOUNTER SYMPTOMS
FEVER: 0
CHILLS: 0
CARDIOVASCULAR NEGATIVE: 1
ALLERGIC/IMMUNOLOGIC NEGATIVE: 1
EYES NEGATIVE: 1
RESPIRATORY NEGATIVE: 1
NEUROLOGICAL NEGATIVE: 1

## 2025-08-18 ASSESSMENT — PAIN SCALES - GENERAL
PAINLEVEL_OUTOF10: 6
PAINLEVEL_OUTOF10: 7
PAINLEVEL_OUTOF10: 9
PAINLEVEL_OUTOF10: 8
PAINLEVEL_OUTOF10: 7
PAINLEVEL_OUTOF10: 4
PAINLEVEL_OUTOF10: 8
PAINLEVEL_OUTOF10: 0 - NO PAIN
PAINLEVEL_OUTOF10: 7

## 2025-08-18 ASSESSMENT — LIFESTYLE VARIABLES
HOW MANY STANDARD DRINKS CONTAINING ALCOHOL DO YOU HAVE ON A TYPICAL DAY: PATIENT DOES NOT DRINK
AUDIT-C TOTAL SCORE: 0
SKIP TO QUESTIONS 9-10: 1
PRESCIPTION_ABUSE_PAST_12_MONTHS: NO
HOW OFTEN DO YOU HAVE A DRINK CONTAINING ALCOHOL: NEVER
HOW OFTEN DO YOU HAVE 6 OR MORE DRINKS ON ONE OCCASION: NEVER
SUBSTANCE_ABUSE_PAST_12_MONTHS: NO
AUDIT-C TOTAL SCORE: 0

## 2025-08-18 ASSESSMENT — ACTIVITIES OF DAILY LIVING (ADL)
WALKS IN HOME: NEEDS ASSISTANCE
BATHING_ASSISTANCE: MODERATE
LACK_OF_TRANSPORTATION: NO
HEARING - LEFT EAR: FUNCTIONAL
PATIENT'S MEMORY ADEQUATE TO SAFELY COMPLETE DAILY ACTIVITIES?: YES
ADEQUATE_TO_COMPLETE_ADL: YES
JUDGMENT_ADEQUATE_SAFELY_COMPLETE_DAILY_ACTIVITIES: YES
FEEDING YOURSELF: INDEPENDENT
ASSISTIVE_DEVICE: WALKER;CANE
GROOMING: INDEPENDENT
HEARING - RIGHT EAR: FUNCTIONAL
BATHING: NEEDS ASSISTANCE
TOILETING: NEEDS ASSISTANCE
DRESSING YOURSELF: INDEPENDENT

## 2025-08-18 ASSESSMENT — PATIENT HEALTH QUESTIONNAIRE - PHQ9
2. FEELING DOWN, DEPRESSED OR HOPELESS: NOT AT ALL
1. LITTLE INTEREST OR PLEASURE IN DOING THINGS: NOT AT ALL
SUM OF ALL RESPONSES TO PHQ9 QUESTIONS 1 & 2: 0

## 2025-08-18 ASSESSMENT — PAIN DESCRIPTION - PAIN TYPE: TYPE: ACUTE PAIN

## 2025-08-18 ASSESSMENT — PAIN DESCRIPTION - LOCATION
LOCATION: BACK
LOCATION: LEG

## 2025-08-19 LAB
ALBUMIN SERPL BCP-MCNC: 3.3 G/DL (ref 3.4–5)
ALP SERPL-CCNC: 96 U/L (ref 33–136)
ALT SERPL W P-5'-P-CCNC: 15 U/L (ref 10–52)
ANION GAP SERPL CALC-SCNC: 12 MMOL/L (ref 10–20)
AST SERPL W P-5'-P-CCNC: 13 U/L (ref 9–39)
BILIRUB SERPL-MCNC: 0.4 MG/DL (ref 0–1.2)
BUN SERPL-MCNC: 10 MG/DL (ref 6–23)
CALCIUM SERPL-MCNC: 8.7 MG/DL (ref 8.6–10.3)
CHLORIDE SERPL-SCNC: 105 MMOL/L (ref 98–107)
CO2 SERPL-SCNC: 29 MMOL/L (ref 21–32)
CREAT SERPL-MCNC: 0.6 MG/DL (ref 0.5–1.3)
EGFRCR SERPLBLD CKD-EPI 2021: >90 ML/MIN/1.73M*2
ERYTHROCYTE [DISTWIDTH] IN BLOOD BY AUTOMATED COUNT: 13.2 % (ref 11.5–14.5)
GLUCOSE SERPL-MCNC: 102 MG/DL (ref 74–99)
HCT VFR BLD AUTO: 38.7 % (ref 41–52)
HGB BLD-MCNC: 12.5 G/DL (ref 13.5–17.5)
MCH RBC QN AUTO: 30.6 PG (ref 26–34)
MCHC RBC AUTO-ENTMCNC: 32.3 G/DL (ref 32–36)
MCV RBC AUTO: 95 FL (ref 80–100)
NRBC BLD-RTO: 0 /100 WBCS (ref 0–0)
PLATELET # BLD AUTO: 333 X10*3/UL (ref 150–450)
POTASSIUM SERPL-SCNC: 3.7 MMOL/L (ref 3.5–5.3)
PROT SERPL-MCNC: 6.2 G/DL (ref 6.4–8.2)
RBC # BLD AUTO: 4.09 X10*6/UL (ref 4.5–5.9)
SODIUM SERPL-SCNC: 142 MMOL/L (ref 136–145)
VANCOMYCIN SERPL-MCNC: 15 UG/ML (ref 5–20)
WBC # BLD AUTO: 9.9 X10*3/UL (ref 4.4–11.3)

## 2025-08-19 PROCEDURE — 97535 SELF CARE MNGMENT TRAINING: CPT | Mod: GO

## 2025-08-19 PROCEDURE — 36415 COLL VENOUS BLD VENIPUNCTURE: CPT | Performed by: INTERNAL MEDICINE

## 2025-08-19 PROCEDURE — 2500000004 HC RX 250 GENERAL PHARMACY W/ HCPCS (ALT 636 FOR OP/ED): Performed by: INTERNAL MEDICINE

## 2025-08-19 PROCEDURE — 2500000005 HC RX 250 GENERAL PHARMACY W/O HCPCS: Performed by: INTERNAL MEDICINE

## 2025-08-19 PROCEDURE — 80202 ASSAY OF VANCOMYCIN: CPT | Performed by: INTERNAL MEDICINE

## 2025-08-19 PROCEDURE — 2500000002 HC RX 250 W HCPCS SELF ADMINISTERED DRUGS (ALT 637 FOR MEDICARE OP, ALT 636 FOR OP/ED): Performed by: INTERNAL MEDICINE

## 2025-08-19 PROCEDURE — 1100000001 HC PRIVATE ROOM DAILY

## 2025-08-19 PROCEDURE — 84075 ASSAY ALKALINE PHOSPHATASE: CPT | Performed by: INTERNAL MEDICINE

## 2025-08-19 PROCEDURE — 2500000001 HC RX 250 WO HCPCS SELF ADMINISTERED DRUGS (ALT 637 FOR MEDICARE OP): Performed by: INTERNAL MEDICINE

## 2025-08-19 PROCEDURE — 85027 COMPLETE CBC AUTOMATED: CPT | Performed by: INTERNAL MEDICINE

## 2025-08-19 RX ORDER — AMLODIPINE BESYLATE 5 MG/1
5 TABLET ORAL DAILY
Status: DISCONTINUED | OUTPATIENT
Start: 2025-08-20 | End: 2025-08-20 | Stop reason: HOSPADM

## 2025-08-19 RX ADMIN — LOSARTAN POTASSIUM 100 MG: 50 TABLET, FILM COATED ORAL at 08:41

## 2025-08-19 RX ADMIN — OXYCODONE HYDROCHLORIDE AND ACETAMINOPHEN 1 TABLET: 5; 325 TABLET ORAL at 20:23

## 2025-08-19 RX ADMIN — HYDROMORPHONE HYDROCHLORIDE 1 MG: 1 INJECTION, SOLUTION INTRAMUSCULAR; INTRAVENOUS; SUBCUTANEOUS at 09:00

## 2025-08-19 RX ADMIN — VANCOMYCIN HYDROCHLORIDE 1250 MG: 1.25 INJECTION, SOLUTION INTRAVITREAL at 11:18

## 2025-08-19 RX ADMIN — Medication 3 MG: at 20:23

## 2025-08-19 RX ADMIN — AMLODIPINE BESYLATE 10 MG: 10 TABLET ORAL at 08:42

## 2025-08-19 RX ADMIN — SIMVASTATIN 40 MG: 20 TABLET, FILM COATED ORAL at 08:42

## 2025-08-19 RX ADMIN — MELOXICAM 15 MG: 15 TABLET ORAL at 09:41

## 2025-08-19 RX ADMIN — TAMSULOSIN HYDROCHLORIDE 0.4 MG: 0.4 CAPSULE ORAL at 20:23

## 2025-08-19 RX ADMIN — OXYCODONE HYDROCHLORIDE AND ACETAMINOPHEN 1 TABLET: 5; 325 TABLET ORAL at 14:22

## 2025-08-19 RX ADMIN — ALLOPURINOL 100 MG: 100 TABLET ORAL at 08:42

## 2025-08-19 RX ADMIN — PANTOPRAZOLE SODIUM 40 MG: 40 TABLET, DELAYED RELEASE ORAL at 08:41

## 2025-08-19 RX ADMIN — FLUOXETINE HYDROCHLORIDE 20 MG: 20 CAPSULE ORAL at 08:42

## 2025-08-19 RX ADMIN — BUPROPION HYDROCHLORIDE 100 MG: 100 TABLET, FILM COATED ORAL at 09:41

## 2025-08-19 RX ADMIN — OXYCODONE HYDROCHLORIDE AND ACETAMINOPHEN 1 TABLET: 5; 325 TABLET ORAL at 05:25

## 2025-08-19 RX ADMIN — HYDROMORPHONE HYDROCHLORIDE 1 MG: 1 INJECTION, SOLUTION INTRAMUSCULAR; INTRAVENOUS; SUBCUTANEOUS at 18:28

## 2025-08-19 RX ADMIN — HYDROMORPHONE HYDROCHLORIDE 1 MG: 1 INJECTION, SOLUTION INTRAMUSCULAR; INTRAVENOUS; SUBCUTANEOUS at 22:20

## 2025-08-19 RX ADMIN — VANCOMYCIN HYDROCHLORIDE 1250 MG: 1.25 INJECTION, SOLUTION INTRAVITREAL at 22:26

## 2025-08-19 RX ADMIN — ENOXAPARIN SODIUM 40 MG: 100 INJECTION SUBCUTANEOUS at 08:42

## 2025-08-19 RX ADMIN — METHOCARBAMOL TABLETS 750 MG: 750 TABLET, COATED ORAL at 08:42

## 2025-08-19 RX ADMIN — LOPERAMIDE HYDROCHLORIDE 4 MG: 2 CAPSULE ORAL at 08:41

## 2025-08-19 RX ADMIN — BACLOFEN 20 MG: 10 TABLET ORAL at 20:23

## 2025-08-19 RX ADMIN — GABAPENTIN 600 MG: 300 CAPSULE ORAL at 08:42

## 2025-08-19 RX ADMIN — FUROSEMIDE 20 MG: 10 INJECTION, SOLUTION INTRAMUSCULAR; INTRAVENOUS at 09:00

## 2025-08-19 ASSESSMENT — PAIN SCALES - GENERAL
PAINLEVEL_OUTOF10: 8
PAINLEVEL_OUTOF10: 8
PAINLEVEL_OUTOF10: 0 - NO PAIN
PAINLEVEL_OUTOF10: 5 - MODERATE PAIN
PAINLEVEL_OUTOF10: 8
PAINLEVEL_OUTOF10: 0 - NO PAIN
PAINLEVEL_OUTOF10: 8
PAINLEVEL_OUTOF10: 0 - NO PAIN
PAINLEVEL_OUTOF10: 3
PAINLEVEL_OUTOF10: 7
PAINLEVEL_OUTOF10: 8

## 2025-08-19 ASSESSMENT — COGNITIVE AND FUNCTIONAL STATUS - GENERAL
EATING MEALS: A LITTLE
DRESSING REGULAR LOWER BODY CLOTHING: A LITTLE
DAILY ACTIVITIY SCORE: 17
MOBILITY SCORE: 18
CLIMB 3 TO 5 STEPS WITH RAILING: A LITTLE
WALKING IN HOSPITAL ROOM: A LITTLE
DRESSING REGULAR UPPER BODY CLOTHING: A LITTLE
PERSONAL GROOMING: A LITTLE
HELP NEEDED FOR BATHING: A LITTLE
TOILETING: A LOT
MOVING FROM LYING ON BACK TO SITTING ON SIDE OF FLAT BED WITH BEDRAILS: A LITTLE
DRESSING REGULAR LOWER BODY CLOTHING: A LOT
DRESSING REGULAR UPPER BODY CLOTHING: A LITTLE
MOVING TO AND FROM BED TO CHAIR: A LITTLE
HELP NEEDED FOR BATHING: A LOT
TOILETING: A LITTLE
STANDING UP FROM CHAIR USING ARMS: A LITTLE
TURNING FROM BACK TO SIDE WHILE IN FLAT BAD: A LITTLE
DAILY ACTIVITIY SCORE: 18

## 2025-08-19 ASSESSMENT — PAIN - FUNCTIONAL ASSESSMENT
PAIN_FUNCTIONAL_ASSESSMENT: 0-10

## 2025-08-19 ASSESSMENT — PAIN DESCRIPTION - DESCRIPTORS: DESCRIPTORS: SHARP

## 2025-08-19 ASSESSMENT — PAIN DESCRIPTION - LOCATION
LOCATION: BACK
LOCATION: NECK
LOCATION: NECK

## 2025-08-19 ASSESSMENT — ACTIVITIES OF DAILY LIVING (ADL): HOME_MANAGEMENT_TIME_ENTRY: 32

## 2025-08-20 ENCOUNTER — PHARMACY VISIT (OUTPATIENT)
Dept: PHARMACY | Facility: CLINIC | Age: 70
End: 2025-08-20
Payer: COMMERCIAL

## 2025-08-20 VITALS
HEART RATE: 80 BPM | OXYGEN SATURATION: 100 % | HEIGHT: 75 IN | BODY MASS INDEX: 26.73 KG/M2 | DIASTOLIC BLOOD PRESSURE: 55 MMHG | RESPIRATION RATE: 18 BRPM | TEMPERATURE: 97.2 F | WEIGHT: 215 LBS | SYSTOLIC BLOOD PRESSURE: 105 MMHG

## 2025-08-20 LAB
BACTERIA SPEC AEROBE CULT: ABNORMAL
BACTERIA SPEC ANAEROBE CULT: ABNORMAL

## 2025-08-20 PROCEDURE — 2500000001 HC RX 250 WO HCPCS SELF ADMINISTERED DRUGS (ALT 637 FOR MEDICARE OP): Performed by: INTERNAL MEDICINE

## 2025-08-20 PROCEDURE — 97535 SELF CARE MNGMENT TRAINING: CPT | Mod: GO,CO

## 2025-08-20 PROCEDURE — 2500000002 HC RX 250 W HCPCS SELF ADMINISTERED DRUGS (ALT 637 FOR MEDICARE OP, ALT 636 FOR OP/ED): Performed by: INTERNAL MEDICINE

## 2025-08-20 PROCEDURE — 2500000004 HC RX 250 GENERAL PHARMACY W/ HCPCS (ALT 636 FOR OP/ED): Performed by: INTERNAL MEDICINE

## 2025-08-20 PROCEDURE — RXMED WILLOW AMBULATORY MEDICATION CHARGE

## 2025-08-20 RX ORDER — OXYCODONE AND ACETAMINOPHEN 5; 325 MG/1; MG/1
2 TABLET ORAL EVERY 6 HOURS PRN
Refills: 0 | Status: DISCONTINUED | OUTPATIENT
Start: 2025-08-20 | End: 2025-08-20 | Stop reason: HOSPADM

## 2025-08-20 RX ORDER — LINEZOLID 600 MG/1
600 TABLET, FILM COATED ORAL EVERY 12 HOURS SCHEDULED
Status: DISCONTINUED | OUTPATIENT
Start: 2025-08-20 | End: 2025-08-20 | Stop reason: HOSPADM

## 2025-08-20 RX ORDER — LINEZOLID 600 MG/1
600 TABLET, FILM COATED ORAL EVERY 12 HOURS SCHEDULED
Qty: 14 TABLET | Refills: 0 | Status: SHIPPED | OUTPATIENT
Start: 2025-08-20 | End: 2025-08-27

## 2025-08-20 RX ADMIN — VANCOMYCIN HYDROCHLORIDE 1250 MG: 1.25 INJECTION, SOLUTION INTRAVITREAL at 12:42

## 2025-08-20 RX ADMIN — FLUOXETINE HYDROCHLORIDE 20 MG: 20 CAPSULE ORAL at 09:42

## 2025-08-20 RX ADMIN — MELOXICAM 15 MG: 15 TABLET ORAL at 09:43

## 2025-08-20 RX ADMIN — ALLOPURINOL 100 MG: 100 TABLET ORAL at 09:42

## 2025-08-20 RX ADMIN — PANTOPRAZOLE SODIUM 40 MG: 40 TABLET, DELAYED RELEASE ORAL at 09:42

## 2025-08-20 RX ADMIN — LOPERAMIDE HYDROCHLORIDE 4 MG: 2 CAPSULE ORAL at 09:41

## 2025-08-20 RX ADMIN — LOSARTAN POTASSIUM 100 MG: 50 TABLET, FILM COATED ORAL at 09:42

## 2025-08-20 RX ADMIN — OXYCODONE HYDROCHLORIDE AND ACETAMINOPHEN 2 TABLET: 5; 325 TABLET ORAL at 10:11

## 2025-08-20 RX ADMIN — METHOCARBAMOL TABLETS 750 MG: 750 TABLET, COATED ORAL at 09:43

## 2025-08-20 RX ADMIN — BUPROPION HYDROCHLORIDE 100 MG: 100 TABLET, FILM COATED ORAL at 09:43

## 2025-08-20 RX ADMIN — SIMVASTATIN 40 MG: 20 TABLET, FILM COATED ORAL at 09:42

## 2025-08-20 RX ADMIN — ENOXAPARIN SODIUM 40 MG: 100 INJECTION SUBCUTANEOUS at 09:41

## 2025-08-20 RX ADMIN — OXYCODONE HYDROCHLORIDE AND ACETAMINOPHEN 2 TABLET: 5; 325 TABLET ORAL at 16:28

## 2025-08-20 RX ADMIN — GABAPENTIN 600 MG: 300 CAPSULE ORAL at 09:41

## 2025-08-20 RX ADMIN — AMLODIPINE BESYLATE 5 MG: 5 TABLET ORAL at 09:42

## 2025-08-20 ASSESSMENT — PAIN DESCRIPTION - ORIENTATION
ORIENTATION: RIGHT;LEFT
ORIENTATION: RIGHT;LEFT

## 2025-08-20 ASSESSMENT — PAIN - FUNCTIONAL ASSESSMENT
PAIN_FUNCTIONAL_ASSESSMENT: 0-10

## 2025-08-20 ASSESSMENT — PAIN SCALES - GENERAL
PAINLEVEL_OUTOF10: 3
PAINLEVEL_OUTOF10: 7
PAINLEVEL_OUTOF10: 3
PAINLEVEL_OUTOF10: 7
PAINLEVEL_OUTOF10: 8

## 2025-08-20 ASSESSMENT — COGNITIVE AND FUNCTIONAL STATUS - GENERAL
DAILY ACTIVITIY SCORE: 19
HELP NEEDED FOR BATHING: A LOT
TOILETING: A LITTLE
DRESSING REGULAR LOWER BODY CLOTHING: A LITTLE
DRESSING REGULAR UPPER BODY CLOTHING: A LITTLE

## 2025-08-20 ASSESSMENT — ACTIVITIES OF DAILY LIVING (ADL)
HOME_MANAGEMENT_TIME_ENTRY: 14
HOME_MANAGEMENT_TIME_ENTRY: 14

## 2025-08-20 ASSESSMENT — PAIN DESCRIPTION - DESCRIPTORS
DESCRIPTORS: ACHING;DISCOMFORT

## 2025-08-20 ASSESSMENT — PAIN DESCRIPTION - LOCATION
LOCATION: OTHER (COMMENT)
LOCATION: BUTTOCKS

## 2025-08-21 LAB
BACTERIA BLD CULT: NORMAL
BACTERIA BLD CULT: NORMAL

## 2025-08-22 ENCOUNTER — APPOINTMENT (OUTPATIENT)
Dept: ORTHOPEDIC SURGERY | Facility: CLINIC | Age: 70
End: 2025-08-22
Payer: MEDICARE

## (undated) DEVICE — GRASPER TIP, LAPCLINCH, DISP

## (undated) DEVICE — DRAPE, ARM XI

## (undated) DEVICE — SLEEVE, VASO PRESS, CALF GARMENT, MEDIUM, GREEN

## (undated) DEVICE — SYRINGE, 10 CC, SLIP TIP

## (undated) DEVICE — DRESSING, TRANSPARENT, TEGADERM, 2-3/8 X 2-3/4 IN

## (undated) DEVICE — SYRINGE, 20 CC, LUER SLIP

## (undated) DEVICE — BANDAGE, COFLEX, 6 X 5 YDS, FOAM TAN, STERILE, LF

## (undated) DEVICE — Device

## (undated) DEVICE — FORCEPS, PROGRASP, DAVINCI XI

## (undated) DEVICE — BANDAGE, GAUZE, CONFORMING, KERLIX, 6 PLY, 4.5 IN X 4.1 YD

## (undated) DEVICE — CATHETER TRAY, SURESTEP, 16FR, URINE METER W/STATLOCK

## (undated) DEVICE — CARE KIT, LAPAROSCOPIC, ADVANCED

## (undated) DEVICE — SCISSORS, MONOPOLAR, CURVED, 8MM

## (undated) DEVICE — GOWN, ASTOUND, XL

## (undated) DEVICE — SPONGE, GAUZE, XRAY DECT, 16 PLY, 4 X 8, W/MASTER DMT,STERILE

## (undated) DEVICE — TROCAR, KII OPTICAL BLADELESS 5MM Z THREAD 100MM LNGTH

## (undated) DEVICE — SEAL, UNIVERSAL, 5-12MM

## (undated) DEVICE — DRIVER, NEEDLE, MEGA SUTURE CUT, DAVINCI XI

## (undated) DEVICE — NEEDLE, CLOSURE, OMNICLOSE

## (undated) DEVICE — DRAPE, SHEET, THREE QUARTER, FAN FOLD, 57 X 77 IN

## (undated) DEVICE — DRESSING, TELFA, 3X4

## (undated) DEVICE — LUBRICANT, ELECTROLUBE, F/ELECTRODE TIPS

## (undated) DEVICE — COVER, TIP HOT SHEARS ENDOWRIST

## (undated) DEVICE — DRAPE, COLUMN, DAVINCI XI

## (undated) DEVICE — TUBING SET, BIFURCATED, SMOKE EVAC, FILTERED, F/AIRSEAL